# Patient Record
Sex: FEMALE | Race: OTHER | HISPANIC OR LATINO | Employment: OTHER | URBAN - METROPOLITAN AREA
[De-identification: names, ages, dates, MRNs, and addresses within clinical notes are randomized per-mention and may not be internally consistent; named-entity substitution may affect disease eponyms.]

---

## 2023-10-11 ENCOUNTER — HOSPITAL ENCOUNTER (OUTPATIENT)
Dept: RADIOLOGY | Facility: MEDICAL CENTER | Age: 73
End: 2023-10-11
Attending: NURSE PRACTITIONER
Payer: MEDICARE

## 2023-10-11 ENCOUNTER — OFFICE VISIT (OUTPATIENT)
Dept: URGENT CARE | Facility: PHYSICIAN GROUP | Age: 73
End: 2023-10-11
Payer: MEDICARE

## 2023-10-11 VITALS
HEART RATE: 76 BPM | SYSTOLIC BLOOD PRESSURE: 136 MMHG | DIASTOLIC BLOOD PRESSURE: 70 MMHG | TEMPERATURE: 96.9 F | RESPIRATION RATE: 17 BRPM | BODY MASS INDEX: 24.11 KG/M2 | HEIGHT: 62 IN | WEIGHT: 131 LBS | OXYGEN SATURATION: 98 %

## 2023-10-11 DIAGNOSIS — M79.672 LEFT FOOT PAIN: ICD-10-CM

## 2023-10-11 DIAGNOSIS — L03.116 CELLULITIS OF LEFT FOOT: ICD-10-CM

## 2023-10-11 PROCEDURE — 3078F DIAST BP <80 MM HG: CPT | Performed by: NURSE PRACTITIONER

## 2023-10-11 PROCEDURE — 73630 X-RAY EXAM OF FOOT: CPT | Mod: LT

## 2023-10-11 PROCEDURE — 99204 OFFICE O/P NEW MOD 45 MIN: CPT | Performed by: NURSE PRACTITIONER

## 2023-10-11 PROCEDURE — 3075F SYST BP GE 130 - 139MM HG: CPT | Performed by: NURSE PRACTITIONER

## 2023-10-11 RX ORDER — ATENOLOL 50 MG/1
50 TABLET ORAL DAILY
Status: ON HOLD | COMMUNITY
Start: 2023-10-02 | End: 2024-03-25 | Stop reason: CLARIF

## 2023-10-11 RX ORDER — BENAZEPRIL HYDROCHLORIDE 20 MG/1
20 TABLET ORAL DAILY
Status: ON HOLD | COMMUNITY
Start: 2023-10-02 | End: 2024-03-25 | Stop reason: CLARIF

## 2023-10-11 RX ORDER — EMPAGLIFLOZIN 10 MG/1
10 TABLET, FILM COATED ORAL DAILY
Status: ON HOLD | COMMUNITY
Start: 2023-10-02 | End: 2024-03-25 | Stop reason: CLARIF

## 2023-10-11 RX ORDER — CEPHALEXIN 500 MG/1
500 CAPSULE ORAL 4 TIMES DAILY
Qty: 20 CAPSULE | Refills: 0 | Status: SHIPPED | OUTPATIENT
Start: 2023-10-11 | End: 2023-10-16

## 2023-10-11 RX ORDER — SEMAGLUTIDE 1.34 MG/ML
1 INJECTION, SOLUTION SUBCUTANEOUS
Status: ON HOLD | COMMUNITY

## 2023-10-11 RX ORDER — LORAZEPAM 0.5 MG/1
0.5 TABLET ORAL PRN
Status: ON HOLD | COMMUNITY
Start: 2023-07-26

## 2023-10-11 RX ORDER — PRAVASTATIN SODIUM 40 MG
40 TABLET ORAL
Status: ON HOLD | COMMUNITY
Start: 2023-10-02 | End: 2024-03-25 | Stop reason: CLARIF

## 2023-10-11 RX ORDER — RANITIDINE 150 MG/1
TABLET ORAL
Status: ON HOLD | COMMUNITY
End: 2024-03-25 | Stop reason: CLARIF

## 2023-10-11 RX ORDER — INSULIN DETEMIR 100 [IU]/ML
30 INJECTION, SOLUTION SUBCUTANEOUS
Status: ON HOLD | COMMUNITY
Start: 2023-10-02

## 2023-10-11 RX ORDER — FAMOTIDINE 20 MG/1
20 TABLET, FILM COATED ORAL PRN
Status: ON HOLD | COMMUNITY

## 2023-10-11 RX ORDER — ALLOPURINOL 100 MG/1
TABLET ORAL
Status: ON HOLD | COMMUNITY
Start: 2023-06-30

## 2023-10-11 RX ORDER — GABAPENTIN 300 MG/1
300 CAPSULE ORAL
Status: ON HOLD | COMMUNITY
Start: 2023-10-02

## 2023-10-11 RX ORDER — BLOOD SUGAR DIAGNOSTIC
STRIP MISCELLANEOUS
Status: ON HOLD | COMMUNITY
Start: 2023-04-24

## 2023-10-11 ASSESSMENT — ENCOUNTER SYMPTOMS
CONSTITUTIONAL NEGATIVE: 1
RESPIRATORY NEGATIVE: 1
CHILLS: 0
NEUROLOGICAL NEGATIVE: 1
SHORTNESS OF BREATH: 0
FEVER: 0
CARDIOVASCULAR NEGATIVE: 1

## 2023-10-11 ASSESSMENT — VISUAL ACUITY: OU: 1

## 2023-10-11 NOTE — PROGRESS NOTES
Subjective:     Capri Quinonez is a 73 y.o. female who presents for Foot Swelling (X1 1/2 week L foot swelling, type 2 diabetic )       Foot Swelling  This is a new problem. The problem has been gradually worsening. Pertinent negatives include no chest pain, chills or fever.     2 weeks ago, patient started to experience continuous onset of swelling, pain, warmth, and redness at the top of her left foot.  Starting to experience symptoms of burning approximately to her ankle now.  Denies injury.    Reports history of diabetes as well as diabetic neuropathy.  Takes gabapentin.    Denies fever, chills, calf pain, chest pain, shortness of breath, or other symptoms.    Review of Systems   Constitutional: Negative.  Negative for chills and fever.   Respiratory: Negative.  Negative for shortness of breath.    Cardiovascular: Negative.  Negative for chest pain.   Musculoskeletal:         Per HPI   Neurological: Negative.    All other systems reviewed and are negative.    Refer to HPI for additional details.    During this visit, appropriate PPE was worn, and hand hygiene was performed.    PMH:  has no past medical history on file.    MEDS:   Current Outpatient Medications:     gabapentin (NEURONTIN) 300 MG Cap, Take 300 mg by mouth., Disp: , Rfl:     pravastatin (PRAVACHOL) 40 MG tablet, Take 40 mg by mouth., Disp: , Rfl:     insulin detemir (LEVEMIR FLEXPEN) 100 UNIT/ML injection PEN, Inject 30 Units under the skin., Disp: , Rfl:     glucose blood (ONETOUCH VERIO) strip, TEST BLOOD GLUCOSE TWICE DAILY AS DIRECTED, Disp: , Rfl:     allopurinol (ZYLOPRIM) 100 MG Tab, GEN FOR ZYLOPRIM. TAKE 1 TABLET(100 MG) BY MOUTH DAILY, Disp: , Rfl:     atenolol (TENORMIN) 50 MG Tab, Take 50 mg by mouth every day., Disp: , Rfl:     benazepril (LOTENSIN) 20 MG Tab, Take 20 mg by mouth every day., Disp: , Rfl:     Empagliflozin (JARDIANCE) 10 MG Tab tablet, Take 10 mg by mouth every day., Disp: , Rfl:     famotidine (PEPCID) 20 MG Tab,  "Take 20 mg by mouth., Disp: , Rfl:     raNITidine (ZANTAC) 150 MG Tab, Take  by mouth., Disp: , Rfl:     Semaglutide, 1 MG/DOSE, 4 MG/3ML Solution Pen-injector, Inject 1 mg under the skin., Disp: , Rfl:     LORazepam (ATIVAN) 0.5 MG Tab, Take 0.5 mg by mouth., Disp: , Rfl:     Semaglutide, 1 MG/DOSE, (OZEMPIC, 1 MG/DOSE,) 2 MG/1.5ML Solution Pen-injector, Inject  under the skin., Disp: , Rfl:     cephALEXin (KEFLEX) 500 MG Cap, Take 1 Capsule by mouth 4 times a day for 5 days., Disp: 20 Capsule, Rfl: 0    ALLERGIES:   Allergies   Allergen Reactions    Atorvastatin Unspecified    Hydrocodone-Acetaminophen Nausea and Unspecified     SURGHX: History reviewed. No pertinent surgical history.  SOCHX:  reports that she has been smoking cigarettes. She has never used smokeless tobacco. She reports that she does not currently use alcohol. She reports that she does not use drugs.    FH: Per HPI as applicable/pertinent.      Objective:     /70   Pulse 76   Temp 36.1 °C (96.9 °F) (Temporal)   Resp 17   Ht 1.575 m (5' 2\")   Wt 59.4 kg (131 lb)   SpO2 98%   BMI 23.96 kg/m²     Physical Exam  Nursing note reviewed.   Constitutional:       General: She is not in acute distress.     Appearance: She is well-developed. She is not ill-appearing or toxic-appearing.   Eyes:      General: Vision grossly intact.   Cardiovascular:      Rate and Rhythm: Normal rate.      Pulses: Normal pulses.   Pulmonary:      Effort: Pulmonary effort is normal. No respiratory distress.   Musculoskeletal:         General: No deformity. Normal range of motion.      Left lower leg: No swelling or tenderness.      Left foot: Normal range of motion and normal capillary refill. Swelling and tenderness present. No deformity or laceration. Normal pulse.      Comments: Erythema, warmth, TTP, swelling over dorsal left foot spreading to ankle; negative left Homans' sign, no left calf tenderness   Skin:     General: Skin is warm and dry.      Capillary " Refill: Capillary refill takes less than 2 seconds.      Coloration: Skin is not pale.      Findings: Erythema present. No lesion or rash.   Neurological:      Mental Status: She is alert and oriented to person, place, and time.      Motor: No weakness.   Psychiatric:         Behavior: Behavior normal. Behavior is cooperative.     X-ray of left foot:    Details    Reading Physician Reading Date Result Priority   Anson Hinojosa M.D.  [phone] 10/11/2023 Urgent [facility]     Narrative & Impression     10/11/2023 12:36 PM     HISTORY/REASON FOR EXAM:  Left foot pain after left foot injury    TECHNIQUE/EXAM DESCRIPTION AND NUMBER OF VIEWS:  3 nonweightbearing views of the LEFT foot.     COMPARISON:  No comparison available     FINDINGS:  Bone mineralization is normal.  There is no evidence of fracture or dislocation.  Soft tissues are normal.  Inferior calcaneal spur is identified.     IMPRESSION:     No evidence of fracture or dislocation.           Exam Ended: 10/11/23 12:45 PM Last Resulted: 10/11/23  1:00 PM           Radiology report and images reviewed by myself. Concur with findings.      Assessment/Plan:     1. Left foot pain  - DX-FOOT-COMPLETE 3+ LEFT; Future    2. Cellulitis of left foot  - cephALEXin (KEFLEX) 500 MG Cap; Take 1 Capsule by mouth 4 times a day for 5 days.  Dispense: 20 Capsule; Refill: 0    Symptoms most consistent with cellulitis.  Rx as above electronically.    Rest and elevate.  OTC ibuprofen as needed for inflammation/pain.    Discussed unlikely DVT.  However, advised to monitor.  Warning signs reviewed.  Patient defers ultrasound at this time.  Urine/ER precautions advised.    Differential diagnosis, natural history, supportive care, over-the-counter symptom management per 's instructions, close monitoring, and indications for immediate follow-up discussed.     All questions answered. Patient agrees with the plan of care.    Billing note: moderate complexity and moderate  risk. New patient. [id number]. Please refer to LOS tool for details.

## 2024-03-25 ENCOUNTER — APPOINTMENT (OUTPATIENT)
Dept: RADIOLOGY | Facility: MEDICAL CENTER | Age: 74
End: 2024-03-25
Attending: EMERGENCY MEDICINE
Payer: MEDICARE

## 2024-03-25 ENCOUNTER — HOSPITAL ENCOUNTER (OUTPATIENT)
Dept: RADIOLOGY | Facility: MEDICAL CENTER | Age: 74
End: 2024-03-25

## 2024-03-25 ENCOUNTER — HOSPITAL ENCOUNTER (INPATIENT)
Facility: MEDICAL CENTER | Age: 74
LOS: 3 days | End: 2024-03-28
Attending: SURGERY | Admitting: SURGERY
Payer: MEDICARE
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
## 2024-03-25 DIAGNOSIS — S36.039A SPLENIC LACERATION, INITIAL ENCOUNTER: ICD-10-CM

## 2024-03-25 DIAGNOSIS — S02.92XA CLOSED FRACTURE OF FACIAL BONE, UNSPECIFIED FACIAL BONE, INITIAL ENCOUNTER (HCC): ICD-10-CM

## 2024-03-25 DIAGNOSIS — T14.90XA TRAUMA: ICD-10-CM

## 2024-03-25 DIAGNOSIS — S20.212A CHEST WALL CONTUSION, LEFT, INITIAL ENCOUNTER: ICD-10-CM

## 2024-03-25 DIAGNOSIS — R55 SYNCOPE, UNSPECIFIED SYNCOPE TYPE: ICD-10-CM

## 2024-03-25 DIAGNOSIS — S02.92XA FACIAL FRACTURE DUE TO FALL, CLOSED, INITIAL ENCOUNTER (HCC): ICD-10-CM

## 2024-03-25 DIAGNOSIS — S16.1XXA STRAIN OF NECK MUSCLE, INITIAL ENCOUNTER: ICD-10-CM

## 2024-03-25 DIAGNOSIS — S36.039A LACERATION OF SPLEEN, INITIAL ENCOUNTER: ICD-10-CM

## 2024-03-25 DIAGNOSIS — T67.1XXA HEAT SYNCOPE, INITIAL ENCOUNTER: ICD-10-CM

## 2024-03-25 DIAGNOSIS — Z79.02 ANTIPLATELET OR ANTITHROMBOTIC LONG-TERM USE: ICD-10-CM

## 2024-03-25 PROBLEM — I10 HYPERTENSION: Status: ACTIVE | Noted: 2024-03-25

## 2024-03-25 PROBLEM — E78.5 DYSLIPIDEMIA: Status: ACTIVE | Noted: 2024-03-25

## 2024-03-25 PROBLEM — M10.9 GOUT: Status: ACTIVE | Noted: 2024-03-25

## 2024-03-25 PROBLEM — S02.85XA ORBITAL FRACTURE, CLOSED, INITIAL ENCOUNTER (HCC): Status: ACTIVE | Noted: 2024-03-25

## 2024-03-25 PROBLEM — K74.60 CIRRHOSIS (HCC): Status: ACTIVE | Noted: 2024-03-25

## 2024-03-25 PROBLEM — Z53.09 CONTRAINDICATION TO DEEP VEIN THROMBOSIS (DVT) PROPHYLAXIS: Status: ACTIVE | Noted: 2024-03-25

## 2024-03-25 LAB
ABO + RH BLD: NORMAL
ABO GROUP BLD: NORMAL
ALBUMIN SERPL BCP-MCNC: 4 G/DL (ref 3.2–4.9)
ALBUMIN/GLOB SERPL: 1 G/DL
ALP SERPL-CCNC: 504 U/L (ref 30–99)
ALT SERPL-CCNC: 137 U/L (ref 2–50)
ANION GAP SERPL CALC-SCNC: 12 MMOL/L (ref 7–16)
APTT PPP: 31.8 SEC (ref 24.7–36)
AST SERPL-CCNC: 301 U/L (ref 12–45)
BILIRUB SERPL-MCNC: 2.1 MG/DL (ref 0.1–1.5)
BLD GP AB SCN SERPL QL: NORMAL
BUN SERPL-MCNC: 16 MG/DL (ref 8–22)
CALCIUM ALBUM COR SERPL-MCNC: 9.7 MG/DL (ref 8.5–10.5)
CALCIUM SERPL-MCNC: 9.7 MG/DL (ref 8.5–10.5)
CFT BLD TEG: 5.3 MIN (ref 4.6–9.1)
CFT P HPASE BLD TEG: 5.1 MIN (ref 4.3–8.3)
CHLORIDE SERPL-SCNC: 99 MMOL/L (ref 96–112)
CLOT ANGLE BLD TEG: 74.4 DEGREES (ref 63–78)
CLOT LYSIS 30M P MA LENFR BLD TEG: 0.1 % (ref 0–2.6)
CO2 SERPL-SCNC: 27 MMOL/L (ref 20–33)
CREAT SERPL-MCNC: 0.79 MG/DL (ref 0.5–1.4)
CT.EXTRINSIC BLD ROTEM: 1.1 MIN (ref 0.8–2.1)
EKG IMPRESSION: NORMAL
EKG IMPRESSION: NORMAL
ERYTHROCYTE [DISTWIDTH] IN BLOOD BY AUTOMATED COUNT: 49.6 FL (ref 35.9–50)
EST. AVERAGE GLUCOSE BLD GHB EST-MCNC: 128 MG/DL
ETHANOL BLD-MCNC: <10.1 MG/DL
GFR SERPLBLD CREATININE-BSD FMLA CKD-EPI: 80 ML/MIN/1.73 M 2
GLOBULIN SER CALC-MCNC: 3.9 G/DL (ref 1.9–3.5)
GLUCOSE BLD STRIP.AUTO-MCNC: 137 MG/DL (ref 65–99)
GLUCOSE SERPL-MCNC: 146 MG/DL (ref 65–99)
HBA1C MFR BLD: 6.1 % (ref 4–5.6)
HCG SERPL QL: NEGATIVE
HCT VFR BLD AUTO: 41.1 % (ref 37–47)
HGB BLD-MCNC: 11.7 G/DL (ref 12–16)
HGB BLD-MCNC: 13.7 G/DL (ref 12–16)
INR PPP: 1.04 (ref 0.87–1.13)
MAGNESIUM SERPL-MCNC: 1.9 MG/DL (ref 1.5–2.5)
MCF BLD TEG: 64.6 MM (ref 52–69)
MCF.PLATELET INHIB BLD ROTEM: 30.6 MM (ref 15–32)
MCH RBC QN AUTO: 29.1 PG (ref 27–33)
MCHC RBC AUTO-ENTMCNC: 33.3 G/DL (ref 32.2–35.5)
MCV RBC AUTO: 87.4 FL (ref 81.4–97.8)
PA AA BLD-ACNC: 40.9 % (ref 0–11)
PA ADP BLD-ACNC: 19 % (ref 0–17)
PHOSPHATE SERPL-MCNC: 2.7 MG/DL (ref 2.5–4.5)
PLATELET # BLD AUTO: 191 K/UL (ref 164–446)
PMV BLD AUTO: 12.3 FL (ref 9–12.9)
POTASSIUM SERPL-SCNC: 3.1 MMOL/L (ref 3.6–5.5)
PROT SERPL-MCNC: 7.9 G/DL (ref 6–8.2)
PROTHROMBIN TIME: 13.7 SEC (ref 12–14.6)
RBC # BLD AUTO: 4.7 M/UL (ref 4.2–5.4)
RH BLD: NORMAL
SODIUM SERPL-SCNC: 138 MMOL/L (ref 135–145)
TEG ALGORITHM TGALG: ABNORMAL
TROPONIN T SERPL-MCNC: 40 NG/L (ref 6–19)
WBC # BLD AUTO: 10.4 K/UL (ref 4.8–10.8)

## 2024-03-25 PROCEDURE — 90715 TDAP VACCINE 7 YRS/> IM: CPT | Performed by: EMERGENCY MEDICINE

## 2024-03-25 PROCEDURE — 83735 ASSAY OF MAGNESIUM: CPT

## 2024-03-25 PROCEDURE — 84703 CHORIONIC GONADOTROPIN ASSAY: CPT

## 2024-03-25 PROCEDURE — 85384 FIBRINOGEN ACTIVITY: CPT | Mod: 91

## 2024-03-25 PROCEDURE — 85018 HEMOGLOBIN: CPT

## 2024-03-25 PROCEDURE — 93005 ELECTROCARDIOGRAM TRACING: CPT | Performed by: EMERGENCY MEDICINE

## 2024-03-25 PROCEDURE — 99222 1ST HOSP IP/OBS MODERATE 55: CPT | Performed by: SURGERY

## 2024-03-25 PROCEDURE — 83036 HEMOGLOBIN GLYCOSYLATED A1C: CPT

## 2024-03-25 PROCEDURE — 82962 GLUCOSE BLOOD TEST: CPT

## 2024-03-25 PROCEDURE — 86901 BLOOD TYPING SEROLOGIC RH(D): CPT

## 2024-03-25 PROCEDURE — 700102 HCHG RX REV CODE 250 W/ 637 OVERRIDE(OP): Performed by: NURSE PRACTITIONER

## 2024-03-25 PROCEDURE — 85730 THROMBOPLASTIN TIME PARTIAL: CPT

## 2024-03-25 PROCEDURE — 770022 HCHG ROOM/CARE - ICU (200)

## 2024-03-25 PROCEDURE — 93005 ELECTROCARDIOGRAM TRACING: CPT | Performed by: SURGERY

## 2024-03-25 PROCEDURE — 85027 COMPLETE CBC AUTOMATED: CPT

## 2024-03-25 PROCEDURE — 84100 ASSAY OF PHOSPHORUS: CPT

## 2024-03-25 PROCEDURE — 82077 ASSAY SPEC XCP UR&BREATH IA: CPT

## 2024-03-25 PROCEDURE — 72125 CT NECK SPINE W/O DYE: CPT

## 2024-03-25 PROCEDURE — 700111 HCHG RX REV CODE 636 W/ 250 OVERRIDE (IP): Mod: JZ | Performed by: NURSE PRACTITIONER

## 2024-03-25 PROCEDURE — 85610 PROTHROMBIN TIME: CPT

## 2024-03-25 PROCEDURE — 93010 ELECTROCARDIOGRAM REPORT: CPT | Mod: MISDOCU | Performed by: SURGERY

## 2024-03-25 PROCEDURE — 85347 COAGULATION TIME ACTIVATED: CPT

## 2024-03-25 PROCEDURE — 700111 HCHG RX REV CODE 636 W/ 250 OVERRIDE (IP): Performed by: EMERGENCY MEDICINE

## 2024-03-25 PROCEDURE — G0390 TRAUMA RESPONS W/HOSP CRITI: HCPCS

## 2024-03-25 PROCEDURE — 36415 COLL VENOUS BLD VENIPUNCTURE: CPT

## 2024-03-25 PROCEDURE — 70486 CT MAXILLOFACIAL W/O DYE: CPT

## 2024-03-25 PROCEDURE — 71045 X-RAY EXAM CHEST 1 VIEW: CPT

## 2024-03-25 PROCEDURE — A9270 NON-COVERED ITEM OR SERVICE: HCPCS | Performed by: NURSE PRACTITIONER

## 2024-03-25 PROCEDURE — 700105 HCHG RX REV CODE 258: Performed by: NURSE PRACTITIONER

## 2024-03-25 PROCEDURE — 85576 BLOOD PLATELET AGGREGATION: CPT | Mod: 91

## 2024-03-25 PROCEDURE — 93010 ELECTROCARDIOGRAM REPORT: CPT | Performed by: STUDENT IN AN ORGANIZED HEALTH CARE EDUCATION/TRAINING PROGRAM

## 2024-03-25 PROCEDURE — 84484 ASSAY OF TROPONIN QUANT: CPT

## 2024-03-25 PROCEDURE — 86850 RBC ANTIBODY SCREEN: CPT

## 2024-03-25 PROCEDURE — 99291 CRITICAL CARE FIRST HOUR: CPT

## 2024-03-25 PROCEDURE — 86900 BLOOD TYPING SEROLOGIC ABO: CPT

## 2024-03-25 PROCEDURE — 90471 IMMUNIZATION ADMIN: CPT

## 2024-03-25 PROCEDURE — 80053 COMPREHEN METABOLIC PANEL: CPT

## 2024-03-25 RX ORDER — ENEMA 19; 7 G/133ML; G/133ML
1 ENEMA RECTAL
Status: DISCONTINUED | OUTPATIENT
Start: 2024-03-25 | End: 2024-03-28 | Stop reason: HOSPADM

## 2024-03-25 RX ORDER — AMOXICILLIN 250 MG
1 CAPSULE ORAL
Status: DISCONTINUED | OUTPATIENT
Start: 2024-03-25 | End: 2024-03-28 | Stop reason: HOSPADM

## 2024-03-25 RX ORDER — HYDROMORPHONE HYDROCHLORIDE 1 MG/ML
0.25 INJECTION, SOLUTION INTRAMUSCULAR; INTRAVENOUS; SUBCUTANEOUS
Status: DISCONTINUED | OUTPATIENT
Start: 2024-03-25 | End: 2024-03-28 | Stop reason: HOSPADM

## 2024-03-25 RX ORDER — ALLOPURINOL 100 MG/1
100 TABLET ORAL DAILY
Status: DISCONTINUED | OUTPATIENT
Start: 2024-03-26 | End: 2024-03-28 | Stop reason: HOSPADM

## 2024-03-25 RX ORDER — LABETALOL HYDROCHLORIDE 5 MG/ML
10 INJECTION, SOLUTION INTRAVENOUS EVERY 6 HOURS PRN
Status: DISCONTINUED | OUTPATIENT
Start: 2024-03-25 | End: 2024-03-28 | Stop reason: HOSPADM

## 2024-03-25 RX ORDER — ACETAMINOPHEN 325 MG/1
650 TABLET ORAL EVERY 6 HOURS PRN
Status: DISCONTINUED | OUTPATIENT
Start: 2024-03-30 | End: 2024-03-28 | Stop reason: HOSPADM

## 2024-03-25 RX ORDER — AMOXICILLIN 250 MG
1 CAPSULE ORAL NIGHTLY
Status: DISCONTINUED | OUTPATIENT
Start: 2024-03-25 | End: 2024-03-28 | Stop reason: HOSPADM

## 2024-03-25 RX ORDER — POTASSIUM CHLORIDE 20 MEQ/1
20 TABLET, EXTENDED RELEASE ORAL DAILY
Status: DISCONTINUED | OUTPATIENT
Start: 2024-03-25 | End: 2024-03-28 | Stop reason: HOSPADM

## 2024-03-25 RX ORDER — SODIUM CHLORIDE, SODIUM LACTATE, POTASSIUM CHLORIDE, CALCIUM CHLORIDE 600; 310; 30; 20 MG/100ML; MG/100ML; MG/100ML; MG/100ML
INJECTION, SOLUTION INTRAVENOUS CONTINUOUS
Status: DISCONTINUED | OUTPATIENT
Start: 2024-03-25 | End: 2024-03-26

## 2024-03-25 RX ORDER — ATORVASTATIN CALCIUM 80 MG/1
80 TABLET, FILM COATED ORAL DAILY
COMMUNITY
Start: 2024-03-07

## 2024-03-25 RX ORDER — POLYETHYLENE GLYCOL 3350 17 G/17G
1 POWDER, FOR SOLUTION ORAL 2 TIMES DAILY
Status: DISCONTINUED | OUTPATIENT
Start: 2024-03-25 | End: 2024-03-28 | Stop reason: HOSPADM

## 2024-03-25 RX ORDER — OXYCODONE HYDROCHLORIDE 5 MG/1
5 TABLET ORAL
Status: DISCONTINUED | OUTPATIENT
Start: 2024-03-25 | End: 2024-03-28 | Stop reason: HOSPADM

## 2024-03-25 RX ORDER — ASPIRIN 81 MG/1
81 TABLET ORAL DAILY
COMMUNITY
Start: 2024-02-29 | End: 2024-12-25

## 2024-03-25 RX ORDER — ATORVASTATIN CALCIUM 80 MG/1
80 TABLET, FILM COATED ORAL DAILY
Status: DISCONTINUED | OUTPATIENT
Start: 2024-03-26 | End: 2024-03-28 | Stop reason: HOSPADM

## 2024-03-25 RX ORDER — ACETAMINOPHEN 325 MG/1
650 TABLET ORAL EVERY 6 HOURS
Status: DISCONTINUED | OUTPATIENT
Start: 2024-03-25 | End: 2024-03-28 | Stop reason: HOSPADM

## 2024-03-25 RX ORDER — CARVEDILOL 12.5 MG/1
12.5 TABLET ORAL 2 TIMES DAILY
Status: DISCONTINUED | OUTPATIENT
Start: 2024-03-25 | End: 2024-03-28 | Stop reason: HOSPADM

## 2024-03-25 RX ORDER — BISACODYL 10 MG
10 SUPPOSITORY, RECTAL RECTAL
Status: DISCONTINUED | OUTPATIENT
Start: 2024-03-25 | End: 2024-03-28 | Stop reason: HOSPADM

## 2024-03-25 RX ORDER — ONDANSETRON 4 MG/1
4 TABLET, ORALLY DISINTEGRATING ORAL EVERY 4 HOURS PRN
Status: DISCONTINUED | OUTPATIENT
Start: 2024-03-25 | End: 2024-03-28 | Stop reason: HOSPADM

## 2024-03-25 RX ORDER — CARVEDILOL 12.5 MG/1
12.5 TABLET ORAL 2 TIMES DAILY
COMMUNITY
Start: 2024-03-07

## 2024-03-25 RX ORDER — CLOPIDOGREL BISULFATE 75 MG/1
75 TABLET ORAL DAILY
COMMUNITY
Start: 2024-03-07 | End: 2025-03-02

## 2024-03-25 RX ORDER — OXYCODONE HYDROCHLORIDE 5 MG/1
2.5 TABLET ORAL
Status: DISCONTINUED | OUTPATIENT
Start: 2024-03-25 | End: 2024-03-28 | Stop reason: HOSPADM

## 2024-03-25 RX ORDER — COLCHICINE 0.6 MG/1
0.6 TABLET ORAL PRN
COMMUNITY
Start: 2024-02-02

## 2024-03-25 RX ORDER — ONDANSETRON 2 MG/ML
4 INJECTION INTRAMUSCULAR; INTRAVENOUS EVERY 4 HOURS PRN
Status: DISCONTINUED | OUTPATIENT
Start: 2024-03-25 | End: 2024-03-28 | Stop reason: HOSPADM

## 2024-03-25 RX ORDER — DOCUSATE SODIUM 100 MG/1
100 CAPSULE, LIQUID FILLED ORAL 2 TIMES DAILY
Status: DISCONTINUED | OUTPATIENT
Start: 2024-03-25 | End: 2024-03-28 | Stop reason: HOSPADM

## 2024-03-25 RX ADMIN — DOCUSATE SODIUM 50 MG AND SENNOSIDES 8.6 MG 1 TABLET: 8.6; 5 TABLET, FILM COATED ORAL at 20:52

## 2024-03-25 RX ADMIN — CLOSTRIDIUM TETANI TOXOID ANTIGEN (FORMALDEHYDE INACTIVATED), CORYNEBACTERIUM DIPHTHERIAE TOXOID ANTIGEN (FORMALDEHYDE INACTIVATED), BORDETELLA PERTUSSIS TOXOID ANTIGEN (GLUTARALDEHYDE INACTIVATED), BORDETELLA PERTUSSIS FILAMENTOUS HEMAGGLUTININ ANTIGEN (FORMALDEHYDE INACTIVATED), BORDETELLA PERTUSSIS PERTACTIN ANTIGEN, AND BORDETELLA PERTUSSIS FIMBRIAE 2/3 ANTIGEN 0.5 ML: 5; 2; 2.5; 5; 3; 5 INJECTION, SUSPENSION INTRAMUSCULAR at 20:10

## 2024-03-25 RX ADMIN — SODIUM CHLORIDE, POTASSIUM CHLORIDE, SODIUM LACTATE AND CALCIUM CHLORIDE: 600; 310; 30; 20 INJECTION, SOLUTION INTRAVENOUS at 20:52

## 2024-03-25 RX ADMIN — ACETAMINOPHEN 650 MG: 325 TABLET, FILM COATED ORAL at 20:45

## 2024-03-25 RX ADMIN — ONDANSETRON 4 MG: 2 INJECTION INTRAMUSCULAR; INTRAVENOUS at 21:35

## 2024-03-25 ASSESSMENT — PATIENT HEALTH QUESTIONNAIRE - PHQ9
2. FEELING DOWN, DEPRESSED, IRRITABLE, OR HOPELESS: NOT AT ALL
SUM OF ALL RESPONSES TO PHQ9 QUESTIONS 1 AND 2: 0
1. LITTLE INTEREST OR PLEASURE IN DOING THINGS: NOT AT ALL

## 2024-03-25 ASSESSMENT — COGNITIVE AND FUNCTIONAL STATUS - GENERAL
MOVING FROM LYING ON BACK TO SITTING ON SIDE OF FLAT BED: A LITTLE
TOILETING: A LITTLE
MOBILITY SCORE: 21
SUGGESTED CMS G CODE MODIFIER MOBILITY: CJ
CLIMB 3 TO 5 STEPS WITH RAILING: A LITTLE
SUGGESTED CMS G CODE MODIFIER DAILY ACTIVITY: CJ
DAILY ACTIVITIY SCORE: 22
MOVING TO AND FROM BED TO CHAIR: A LITTLE
DRESSING REGULAR LOWER BODY CLOTHING: A LITTLE

## 2024-03-25 ASSESSMENT — LIFESTYLE VARIABLES
TOTAL SCORE: 0
HOW MANY TIMES IN THE PAST YEAR HAVE YOU HAD 5 OR MORE DRINKS IN A DAY: 0
HAVE YOU EVER FELT YOU SHOULD CUT DOWN ON YOUR DRINKING: NO
ON A TYPICAL DAY WHEN YOU DRINK ALCOHOL HOW MANY DRINKS DO YOU HAVE: 0
EVER FELT BAD OR GUILTY ABOUT YOUR DRINKING: NO
AVERAGE NUMBER OF DAYS PER WEEK YOU HAVE A DRINK CONTAINING ALCOHOL: 0
TOTAL SCORE: 0
CONSUMPTION TOTAL: NEGATIVE
EVER HAD A DRINK FIRST THING IN THE MORNING TO STEADY YOUR NERVES TO GET RID OF A HANGOVER: NO
TOTAL SCORE: 0
DOES PATIENT WANT TO STOP DRINKING: NO
ALCOHOL_USE: NO
HAVE PEOPLE ANNOYED YOU BY CRITICIZING YOUR DRINKING: NO

## 2024-03-25 ASSESSMENT — PAIN DESCRIPTION - PAIN TYPE
TYPE: ACUTE PAIN
TYPE: ACUTE PAIN

## 2024-03-25 ASSESSMENT — COPD QUESTIONNAIRES
DURING THE PAST 4 WEEKS HOW MUCH DID YOU FEEL SHORT OF BREATH: NONE/LITTLE OF THE TIME
COPD SCREENING SCORE: 4
DO YOU EVER COUGH UP ANY MUCUS OR PHLEGM?: NO/ONLY WITH OCCASIONAL COLDS OR INFECTIONS
HAVE YOU SMOKED AT LEAST 100 CIGARETTES IN YOUR ENTIRE LIFE: YES

## 2024-03-26 ENCOUNTER — APPOINTMENT (OUTPATIENT)
Dept: CARDIOLOGY | Facility: MEDICAL CENTER | Age: 74
End: 2024-03-26
Attending: NURSE PRACTITIONER
Payer: MEDICARE

## 2024-03-26 ENCOUNTER — APPOINTMENT (OUTPATIENT)
Dept: RADIOLOGY | Facility: MEDICAL CENTER | Age: 74
End: 2024-03-26
Attending: NURSE PRACTITIONER
Payer: MEDICARE

## 2024-03-26 PROBLEM — E11.9 TYPE 2 DIABETES MELLITUS, WITH LONG-TERM CURRENT USE OF INSULIN (HCC): Status: ACTIVE | Noted: 2024-03-26

## 2024-03-26 PROBLEM — Z79.4 TYPE 2 DIABETES MELLITUS, WITH LONG-TERM CURRENT USE OF INSULIN (HCC): Status: ACTIVE | Noted: 2024-03-26

## 2024-03-26 PROBLEM — E87.6 HYPOKALEMIA: Status: ACTIVE | Noted: 2024-03-26

## 2024-03-26 PROBLEM — E11.40 DIABETIC NEUROPATHY ASSOCIATED WITH TYPE 2 DIABETES MELLITUS (HCC): Status: ACTIVE | Noted: 2024-03-26

## 2024-03-26 LAB
ALBUMIN SERPL BCP-MCNC: 3.7 G/DL (ref 3.2–4.9)
ALBUMIN/GLOB SERPL: 1.2 G/DL
ALP SERPL-CCNC: 431 U/L (ref 30–99)
ALT SERPL-CCNC: 134 U/L (ref 2–50)
AMMONIA PLAS-SCNC: 22 UMOL/L (ref 11–45)
ANION GAP SERPL CALC-SCNC: 12 MMOL/L (ref 7–16)
AST SERPL-CCNC: 238 U/L (ref 12–45)
BASOPHILS # BLD AUTO: 0.5 % (ref 0–1.8)
BASOPHILS # BLD: 0.04 K/UL (ref 0–0.12)
BILIRUB SERPL-MCNC: 1.6 MG/DL (ref 0.1–1.5)
BUN SERPL-MCNC: 17 MG/DL (ref 8–22)
CALCIUM ALBUM COR SERPL-MCNC: 9.4 MG/DL (ref 8.5–10.5)
CALCIUM SERPL-MCNC: 9.2 MG/DL (ref 8.5–10.5)
CHLORIDE SERPL-SCNC: 100 MMOL/L (ref 96–112)
CO2 SERPL-SCNC: 27 MMOL/L (ref 20–33)
CREAT SERPL-MCNC: 0.71 MG/DL (ref 0.5–1.4)
EOSINOPHIL # BLD AUTO: 0.11 K/UL (ref 0–0.51)
EOSINOPHIL NFR BLD: 1.3 % (ref 0–6.9)
ERYTHROCYTE [DISTWIDTH] IN BLOOD BY AUTOMATED COUNT: 49 FL (ref 35.9–50)
GFR SERPLBLD CREATININE-BSD FMLA CKD-EPI: 90 ML/MIN/1.73 M 2
GLOBULIN SER CALC-MCNC: 3.2 G/DL (ref 1.9–3.5)
GLUCOSE BLD STRIP.AUTO-MCNC: 119 MG/DL (ref 65–99)
GLUCOSE BLD STRIP.AUTO-MCNC: 121 MG/DL (ref 65–99)
GLUCOSE BLD STRIP.AUTO-MCNC: 129 MG/DL (ref 65–99)
GLUCOSE BLD STRIP.AUTO-MCNC: 159 MG/DL (ref 65–99)
GLUCOSE BLD STRIP.AUTO-MCNC: 178 MG/DL (ref 65–99)
GLUCOSE SERPL-MCNC: 183 MG/DL (ref 65–99)
HCT VFR BLD AUTO: 34.2 % (ref 37–47)
HGB BLD-MCNC: 11 G/DL (ref 12–16)
HGB BLD-MCNC: 11.6 G/DL (ref 12–16)
HGB BLD-MCNC: 11.9 G/DL (ref 12–16)
IMM GRANULOCYTES # BLD AUTO: 0.03 K/UL (ref 0–0.11)
IMM GRANULOCYTES NFR BLD AUTO: 0.4 % (ref 0–0.9)
LV EJECT FRACT  99904: 68
LV EJECT FRACT MOD 2C 99903: 59.08
LV EJECT FRACT MOD 4C 99902: 74.97
LV EJECT FRACT MOD BP 99901: 68.12
LYMPHOCYTES # BLD AUTO: 0.93 K/UL (ref 1–4.8)
LYMPHOCYTES NFR BLD: 11 % (ref 22–41)
MAGNESIUM SERPL-MCNC: 1.9 MG/DL (ref 1.5–2.5)
MCH RBC QN AUTO: 29.9 PG (ref 27–33)
MCHC RBC AUTO-ENTMCNC: 34.8 G/DL (ref 32.2–35.5)
MCV RBC AUTO: 85.9 FL (ref 81.4–97.8)
MONOCYTES # BLD AUTO: 0.47 K/UL (ref 0–0.85)
MONOCYTES NFR BLD AUTO: 5.6 % (ref 0–13.4)
NEUTROPHILS # BLD AUTO: 6.85 K/UL (ref 1.82–7.42)
NEUTROPHILS NFR BLD: 81.2 % (ref 44–72)
NRBC # BLD AUTO: 0 K/UL
NRBC BLD-RTO: 0 /100 WBC (ref 0–0.2)
PHOSPHATE SERPL-MCNC: 3.1 MG/DL (ref 2.5–4.5)
PLATELET # BLD AUTO: 169 K/UL (ref 164–446)
PMV BLD AUTO: 12.4 FL (ref 9–12.9)
POTASSIUM SERPL-SCNC: 3.1 MMOL/L (ref 3.6–5.5)
PROT SERPL-MCNC: 6.9 G/DL (ref 6–8.2)
RBC # BLD AUTO: 3.98 M/UL (ref 4.2–5.4)
SODIUM SERPL-SCNC: 139 MMOL/L (ref 135–145)
TROPONIN T SERPL-MCNC: 34 NG/L (ref 6–19)
WBC # BLD AUTO: 8.4 K/UL (ref 4.8–10.8)

## 2024-03-26 PROCEDURE — 97535 SELF CARE MNGMENT TRAINING: CPT

## 2024-03-26 PROCEDURE — 85018 HEMOGLOBIN: CPT

## 2024-03-26 PROCEDURE — 83735 ASSAY OF MAGNESIUM: CPT

## 2024-03-26 PROCEDURE — A9270 NON-COVERED ITEM OR SERVICE: HCPCS | Performed by: NURSE PRACTITIONER

## 2024-03-26 PROCEDURE — 97162 PT EVAL MOD COMPLEX 30 MIN: CPT

## 2024-03-26 PROCEDURE — 93306 TTE W/DOPPLER COMPLETE: CPT | Mod: 26 | Performed by: INTERNAL MEDICINE

## 2024-03-26 PROCEDURE — 700111 HCHG RX REV CODE 636 W/ 250 OVERRIDE (IP): Mod: JZ

## 2024-03-26 PROCEDURE — 97165 OT EVAL LOW COMPLEX 30 MIN: CPT

## 2024-03-26 PROCEDURE — 84100 ASSAY OF PHOSPHORUS: CPT

## 2024-03-26 PROCEDURE — 82962 GLUCOSE BLOOD TEST: CPT | Mod: 91

## 2024-03-26 PROCEDURE — 36415 COLL VENOUS BLD VENIPUNCTURE: CPT

## 2024-03-26 PROCEDURE — 85025 COMPLETE CBC W/AUTO DIFF WBC: CPT

## 2024-03-26 PROCEDURE — 80053 COMPREHEN METABOLIC PANEL: CPT

## 2024-03-26 PROCEDURE — 700102 HCHG RX REV CODE 250 W/ 637 OVERRIDE(OP)

## 2024-03-26 PROCEDURE — 700102 HCHG RX REV CODE 250 W/ 637 OVERRIDE(OP): Performed by: NURSE PRACTITIONER

## 2024-03-26 PROCEDURE — A9270 NON-COVERED ITEM OR SERVICE: HCPCS

## 2024-03-26 PROCEDURE — 770020 HCHG ROOM/CARE - TELE (206)

## 2024-03-26 PROCEDURE — 84484 ASSAY OF TROPONIN QUANT: CPT

## 2024-03-26 PROCEDURE — 700111 HCHG RX REV CODE 636 W/ 250 OVERRIDE (IP): Performed by: NURSE PRACTITIONER

## 2024-03-26 PROCEDURE — 82140 ASSAY OF AMMONIA: CPT

## 2024-03-26 PROCEDURE — 93306 TTE W/DOPPLER COMPLETE: CPT

## 2024-03-26 PROCEDURE — 93970 EXTREMITY STUDY: CPT

## 2024-03-26 PROCEDURE — 99232 SBSQ HOSP IP/OBS MODERATE 35: CPT

## 2024-03-26 RX ORDER — ACETAMINOPHEN 325 MG/1
650 TABLET ORAL EVERY 4 HOURS PRN
Qty: 30 TABLET | Refills: 0 | Status: SHIPPED | OUTPATIENT
Start: 2024-03-26

## 2024-03-26 RX ORDER — CALCIUM CARBONATE 500 MG/1
500 TABLET, CHEWABLE ORAL 4 TIMES DAILY PRN
Status: DISCONTINUED | OUTPATIENT
Start: 2024-03-26 | End: 2024-03-28 | Stop reason: HOSPADM

## 2024-03-26 RX ORDER — MAGNESIUM SULFATE HEPTAHYDRATE 40 MG/ML
2 INJECTION, SOLUTION INTRAVENOUS ONCE
Status: COMPLETED | OUTPATIENT
Start: 2024-03-26 | End: 2024-03-26

## 2024-03-26 RX ORDER — GABAPENTIN 300 MG/1
300 CAPSULE ORAL 2 TIMES DAILY
Status: DISCONTINUED | OUTPATIENT
Start: 2024-03-26 | End: 2024-03-28 | Stop reason: HOSPADM

## 2024-03-26 RX ORDER — CLOPIDOGREL BISULFATE 75 MG/1
75 TABLET ORAL DAILY
Status: DISCONTINUED | OUTPATIENT
Start: 2024-03-26 | End: 2024-03-28 | Stop reason: HOSPADM

## 2024-03-26 RX ORDER — COLCHICINE 0.6 MG/1
0.6 TABLET ORAL
Status: DISCONTINUED | OUTPATIENT
Start: 2024-03-26 | End: 2024-03-28 | Stop reason: HOSPADM

## 2024-03-26 RX ORDER — POTASSIUM CHLORIDE 20 MEQ/1
40 TABLET, EXTENDED RELEASE ORAL ONCE
Status: COMPLETED | OUTPATIENT
Start: 2024-03-26 | End: 2024-03-26

## 2024-03-26 RX ORDER — ASPIRIN 81 MG/1
81 TABLET ORAL DAILY
Status: DISCONTINUED | OUTPATIENT
Start: 2024-03-26 | End: 2024-03-28 | Stop reason: HOSPADM

## 2024-03-26 RX ADMIN — ACETAMINOPHEN 650 MG: 325 TABLET, FILM COATED ORAL at 23:27

## 2024-03-26 RX ADMIN — CARVEDILOL 12.5 MG: 12.5 TABLET, FILM COATED ORAL at 05:27

## 2024-03-26 RX ADMIN — GABAPENTIN 300 MG: 300 CAPSULE ORAL at 17:28

## 2024-03-26 RX ADMIN — ACETAMINOPHEN 650 MG: 325 TABLET, FILM COATED ORAL at 00:18

## 2024-03-26 RX ADMIN — DOCUSATE SODIUM 100 MG: 100 CAPSULE, LIQUID FILLED ORAL at 17:28

## 2024-03-26 RX ADMIN — ACETAMINOPHEN 650 MG: 325 TABLET, FILM COATED ORAL at 05:26

## 2024-03-26 RX ADMIN — ACETAMINOPHEN 650 MG: 325 TABLET, FILM COATED ORAL at 17:27

## 2024-03-26 RX ADMIN — ANTACID TABLETS 500 MG: 500 TABLET, CHEWABLE ORAL at 20:27

## 2024-03-26 RX ADMIN — ANTACID TABLETS 500 MG: 500 TABLET, CHEWABLE ORAL at 14:35

## 2024-03-26 RX ADMIN — CLOPIDOGREL BISULFATE 75 MG: 75 TABLET ORAL at 09:25

## 2024-03-26 RX ADMIN — CARVEDILOL 12.5 MG: 12.5 TABLET, FILM COATED ORAL at 00:18

## 2024-03-26 RX ADMIN — ALLOPURINOL 100 MG: 100 TABLET ORAL at 05:27

## 2024-03-26 RX ADMIN — DOCUSATE SODIUM 50 MG AND SENNOSIDES 8.6 MG 1 TABLET: 8.6; 5 TABLET, FILM COATED ORAL at 20:22

## 2024-03-26 RX ADMIN — ACETAMINOPHEN 650 MG: 325 TABLET, FILM COATED ORAL at 12:14

## 2024-03-26 RX ADMIN — GABAPENTIN 300 MG: 300 CAPSULE ORAL at 09:26

## 2024-03-26 RX ADMIN — INSULIN HUMAN 1 UNITS: 100 INJECTION, SOLUTION PARENTERAL at 00:21

## 2024-03-26 RX ADMIN — POTASSIUM CHLORIDE 20 MEQ: 1500 TABLET, EXTENDED RELEASE ORAL at 00:18

## 2024-03-26 RX ADMIN — ASPIRIN 81 MG: 81 TABLET, COATED ORAL at 09:25

## 2024-03-26 RX ADMIN — POTASSIUM CHLORIDE 40 MEQ: 1500 TABLET, EXTENDED RELEASE ORAL at 09:25

## 2024-03-26 RX ADMIN — MAGNESIUM SULFATE HEPTAHYDRATE 2 G: 2 INJECTION, SOLUTION INTRAVENOUS at 09:26

## 2024-03-26 RX ADMIN — ATORVASTATIN CALCIUM 80 MG: 80 TABLET, FILM COATED ORAL at 05:26

## 2024-03-26 RX ADMIN — OXYCODONE 5 MG: 5 TABLET ORAL at 23:27

## 2024-03-26 RX ADMIN — HYDROMORPHONE HYDROCHLORIDE 0.25 MG: 1 INJECTION, SOLUTION INTRAMUSCULAR; INTRAVENOUS; SUBCUTANEOUS at 10:44

## 2024-03-26 RX ADMIN — INSULIN HUMAN 1 UNITS: 100 INJECTION, SOLUTION PARENTERAL at 17:30

## 2024-03-26 RX ADMIN — DOCUSATE SODIUM 100 MG: 100 CAPSULE, LIQUID FILLED ORAL at 05:26

## 2024-03-26 RX ADMIN — CARVEDILOL 12.5 MG: 12.5 TABLET, FILM COATED ORAL at 17:28

## 2024-03-26 RX ADMIN — POLYETHYLENE GLYCOL 3350 1 PACKET: 17 POWDER, FOR SOLUTION ORAL at 17:28

## 2024-03-26 ASSESSMENT — COGNITIVE AND FUNCTIONAL STATUS - GENERAL
DAILY ACTIVITIY SCORE: 21
CLIMB 3 TO 5 STEPS WITH RAILING: A LITTLE
DAILY ACTIVITIY SCORE: 20
MOVING TO AND FROM BED TO CHAIR: A LITTLE
WALKING IN HOSPITAL ROOM: A LITTLE
MOBILITY SCORE: 19
DRESSING REGULAR LOWER BODY CLOTHING: A LITTLE
MOVING TO AND FROM BED TO CHAIR: A LITTLE
SUGGESTED CMS G CODE MODIFIER MOBILITY: CJ
SUGGESTED CMS G CODE MODIFIER MOBILITY: CK
MOVING FROM LYING ON BACK TO SITTING ON SIDE OF FLAT BED: A LITTLE
DRESSING REGULAR LOWER BODY CLOTHING: A LITTLE
STANDING UP FROM CHAIR USING ARMS: A LITTLE
HELP NEEDED FOR BATHING: A LITTLE
WALKING IN HOSPITAL ROOM: A LITTLE
TOILETING: A LITTLE
HELP NEEDED FOR BATHING: A LITTLE
SUGGESTED CMS G CODE MODIFIER DAILY ACTIVITY: CJ
MOBILITY SCORE: 20
CLIMB 3 TO 5 STEPS WITH RAILING: A LITTLE
SUGGESTED CMS G CODE MODIFIER DAILY ACTIVITY: CJ
TOILETING: A LITTLE
DRESSING REGULAR UPPER BODY CLOTHING: A LITTLE
STANDING UP FROM CHAIR USING ARMS: A LITTLE

## 2024-03-26 ASSESSMENT — ENCOUNTER SYMPTOMS
FOCAL WEAKNESS: 0
HEADACHES: 0
ABDOMINAL PAIN: 1
NECK PAIN: 0
TINGLING: 0
FEVER: 0
DIZZINESS: 0
PHOTOPHOBIA: 0
MYALGIAS: 0
CHILLS: 0
PALPITATIONS: 0
NAUSEA: 0
WEAKNESS: 0
SHORTNESS OF BREATH: 0
DOUBLE VISION: 0
SPEECH CHANGE: 0
DIAPHORESIS: 0
BACK PAIN: 0
EYE PAIN: 1
BLURRED VISION: 0
VOMITING: 0
TREMORS: 0
SENSORY CHANGE: 0

## 2024-03-26 ASSESSMENT — PAIN DESCRIPTION - PAIN TYPE
TYPE: ACUTE PAIN

## 2024-03-26 ASSESSMENT — GAIT ASSESSMENTS
DEVIATION: NO DEVIATION
GAIT LEVEL OF ASSIST: SUPERVISED
DISTANCE (FEET): 100

## 2024-03-26 ASSESSMENT — ACTIVITIES OF DAILY LIVING (ADL): TOILETING: INDEPENDENT

## 2024-03-26 ASSESSMENT — FIBROSIS 4 INDEX: FIB4 SCORE: 8.64

## 2024-03-26 NOTE — ASSESSMENT & PLAN NOTE
VTE prophylaxis initially contraindicated secondary to elevated bleeding risk.  3/26 Trauma surveillance venous duplex ultrasonography ordered.

## 2024-03-26 NOTE — ASSESSMENT & PLAN NOTE
Chronic condition treated with LEVEMIR & OZEMPIC.  Holding maintenance medication during acute traumatic illness.  Insulin sliding scale.

## 2024-03-26 NOTE — PROGRESS NOTES
Case discussed with Cardiology NETTA.  Once discharged, the patient will walk over to the heart center tomorrow for an appointment at 0930 to get her home cardiac event monitor placed.  She will follow up with her established outpatient cardiologist on Thursday.

## 2024-03-26 NOTE — CARE PLAN
The patient is Stable - Low risk of patient condition declining or worsening    Shift Goals  Clinical Goals: Stable Hmg, restart ASA and plavix per cardiology, pain control, transfer out of ICU  Patient Goals: Eat, stable pain, go home  Family Goals: [place]    Progress made toward(s) clinical / shift goals:  DC planning       Problem: Knowledge Deficit - Standard  Goal: Patient and family/care givers will demonstrate understanding of plan of care, disease process/condition, diagnostic tests and medications  Outcome: Progressing     Problem: Pain - Standard  Goal: Alleviation of pain or a reduction in pain to the patient’s comfort goal  Outcome: Progressing     Problem: Skin Integrity  Goal: Skin integrity is maintained or improved  Outcome: Progressing     Problem: Fall Risk  Goal: Patient will remain free from falls  Outcome: Progressing     Problem: Hemodynamics  Goal: Patient's hemodynamics, fluid balance and neurologic status will be stable or improve  Outcome: Progressing       Patient is not progressing towards the following goals:

## 2024-03-26 NOTE — ASSESSMENT & PLAN NOTE
Hx of stent placement on 3/5/2024.   EKG completed.  ECHO with LVEF 68%  Continuous cardiac monitoring.  Cardiology consultation: continue DAPT & discharge with cardiac event monitor.

## 2024-03-26 NOTE — DISCHARGE INSTRUCTIONS
- Call or seek medical attention for questions or concerns  - Follow up with the [facility] RETURN: PRN  - Follow up with facial surgeon Dr. Alonso Elizabeth  - Follow up with your established cardiologist ASAP  - Follow up with primary care provider within one weeks time  - Resume regular diet  - May take over the counter acetaminophen as needed for pain  - Continue daily over the counter stool softener while on narcotics  - No operation of machinery or motorized vehicles while under the influence of narcotics  - No alcohol, marijuana or illicit drug use while under the influence of narcotics  - In the event of a narcotic overdose naloxone (Narcan) is available without a prescription from any [facility] or [facility]  - No swimming, hot tubs, baths or wound submersion until cleared by outpatient provider. May shower  - No contact sports, strenuous activities, or heavy lifting until cleared by outpatient provider  - If respiratory decompensation, persistent or worsening pain, or signs or symptoms of infection occur seek medical attention

## 2024-03-26 NOTE — CARE PLAN
The patient is Watcher - Medium risk of patient condition declining or worsening    Shift Goals  Clinical Goals: Trend hgb, pain control    Progress made toward(s) clinical / shift goals:    Problem: Knowledge Deficit - Standard  Goal: Patient and family/care givers will demonstrate understanding of plan of care, disease process/condition, diagnostic tests and medications  Outcome: Progressing     Problem: Pain - Standard  Goal: Alleviation of pain or a reduction in pain to the patient’s comfort goal  Outcome: Progressing     Problem: Skin Integrity  Goal: Skin integrity is maintained or improved  Outcome: Progressing     Problem: Fall Risk  Goal: Patient will remain free from falls  Outcome: Progressing     Problem: Hemodynamics  Goal: Patient's hemodynamics, fluid balance and neurologic status will be stable or improve  Outcome: Progressing

## 2024-03-26 NOTE — PROGRESS NOTES
Bedside report received from off going RN/tech: Melissa HUSAIN, assumed care of patient.     Fall Risk Score: MODERATE RISK  Fall risk interventions in place: Place yellow fall risk ID band on patient, Provide patient/family education based on risk assessment, Educate patient/family to call staff for assistance when getting out of bed, Place fall precaution signage outside patient door, Place patient in room close to nursing station, Utilize bed/chair fall alarm, and Bed alarm connected correctly  Bed type: Regular and Low air loss (Kem Score less than 17 interventions in place)  Patient on cardiac monitor: Yes  IVF/IV medications: Not Applicable   Oxygen: Room Air  Bedside sitter: Not Applicable   Isolation: Not applicable

## 2024-03-26 NOTE — DIETARY
"Nutrition services: Day 1 of admit.  Capri Quinonez is a 73 y.o. female with admitting DX of Trauma   Consult received for malnutrition screening tool score of 2 for 2-13lb wt loss x 1 month and poor PO r/t ozempic.    Visited pt at bedside. Pt said that she has not eaten since yesterday morning. Pt said that she received lunch today  but wasn't able to eat a whole bunch of it. Pt said that UBW is 175. Pt stated that she then started ozempic 2 years ago she has lost some wt. However, pt also stated that she doesn't know of any recent wt loss. Pt said that she uses ozempic 1x/week.   Per nutrition focused physical exam, pt w/ moderate scooping in temporalis area and mild depressions in buccal region.     Assessment:  Height: 157.5 cm (5' 2\")  Weight: 55.7 kg (122 lb 12.7 oz) via bed scale on 3/26  Body mass index is 22.46 kg/m²., BMI classification: WNL  Diet/Intake: Regular; pt was NPO/CLD prior. No current PO documentation per flowsheets.     Evaluation:   Per H&P: \" female who experienced a syncopal related fall and struck her head and chest.  She was evaluated at [facility] where CT scans showed orbital fractures and a grade 1 spleen injury.  She is on ASA and Plavix for cardiac stents placed recently at [facility].  EKG shows an age indeterminate anteroseptal infarct. \"  Per NP note this am, \"Facial surgery consulted at 1004 this AM, formal consult to follow...transfer to telemetry   Labs 3/26: K+ 3/1, glu 183; 3/25 A1c 6.1  Meds: Lipitor, insulin (dose not required this am), magnesium sulfate IVPB, zofran PRN, dilaudid PRN, miralax (refused this am), magnesium hydroxide (refused this am), Kdur, senna  Skin: No wounds, no edema  +BM: PTA  Per chat review, pt has lost 8.3% of wt x 5.5 months (not significant)  Wt Readings from Last Encounters:   03/26/24 55.7 kg (122 lb 12.7 oz)   10/11/23 59.4 kg (131 lb)     Malnutrition Risk: Pt meets moderate malnutrition in the context of chronic illness aeb mild fat " loss and moderate muscle loss noted in nutrition focused physical exam.    Recommendations/Plan:   RD to monitor PO intake to determine necessity of nutritional intervention.  Encourage intake of >50%  Document intake of all meals as % taken in ADL's to provide interdisciplinary communication across all shifts.   Monitor weight.  Nutrition rep will continue to see patient for ongoing meal and snack preferences.     RD following.

## 2024-03-26 NOTE — THERAPY
"Occupational Therapy   Initial Evaluation     Patient Name: Capri Quinonez  Age:  73 y.o., Sex:  female  Medical Record #: [id number]  Today's Date: 3/26/2024     Precautions  Precautions: Fall Risk    Assessment  Patient is 73 y.o. female admitted after a syncopal episode in which patient sustained facial fractures, splenic laceration. She had a stent placed 3/5/24. Pt edu on compensatory strategies during ADLs as well as appropriate AE/DME to maximize independence and safety. She is at a supervision level for basic self care, functional mobility, and functional transfers. She denies any further deficits in self care at this time.     Plan    Occupational Therapy Initial Treatment Plan   Duration: Discharge Needs Only    DC Equipment Recommendations: Tub / Shower Seat  Discharge Recommendations: Anticipate that the patient will have no further occupational therapy needs after discharge from the hospital     Subjective    \"I usually do all the shopping and driving.\"     Objective       03/26/24 0820   Prior Living Situation   Prior Services Home-Independent   Housing / Facility 1 Story House   Steps Into Home 1   Bathroom Set up Walk In Shower;Grab Bars;Bathtub / Shower Combination   Equipment Owned Grab Bar(s) In Tub / Shower;Grab Bar(s) By Toilet;Front-Wheel Walker;4-Wheel Walker;Single Point Cane   Lives with - Patient's Self Care Capacity Sibling   Comments Pt lives with her older sister. They share household responsibilities, however Anya primarily takes care of the driving and shopping which her sister cannot do. She reports she has people that can help her with this upon d/c if needed.   Prior Level of ADL Function   Self Feeding Independent   Grooming / Hygiene Independent   Bathing Independent   Dressing Independent   Toileting Independent   Prior Level of IADL Function   Medication Management Independent   Laundry Independent   Kitchen Mobility Independent   Finances Independent   Home Management " Independent   Shopping Independent   Prior Level Of Mobility Independent Without Device in Community;Independent Without Device in Home   Driving / Transportation Driving Independent   Occupation (Pre-Hospital Vocational) Retired Due To Age   Precautions   Precautions Fall Risk   Vitals   Vitals Comments pt with drop in BP after activity, but did not feel symptomatic   Pain 0 - 10 Group   Therapist Pain Assessment Nurse Notified;During Activity  (facial pain and sciatic pain)   Cognition    Cognition / Consciousness WDL   Level of Consciousness Alert   Comments pleasant and cooperative, some word finding difficulty   Active ROM Upper Body   Active ROM Upper Body  WDL   Strength Upper Body   Upper Body Strength  WDL   Coordination Upper Body   Coordination WDL   Balance Assessment   Sitting Balance (Static) Fair +   Sitting Balance (Dynamic) Fair   Standing Balance (Static) Fair   Standing Balance (Dynamic) Fair   Weight Shift Sitting Fair   Weight Shift Standing Fair   Comments no AD   Bed Mobility    Supine to Sit Supervised   Scooting Supervised   Comments flat bed, no rail   ADL Assessment   Grooming Supervision;Seated   Lower Body Dressing Supervision  (don underwear)   Toileting Supervision   Comments educated on use of shower chair upon d/c home as patient had a syncopal episode and would increase safety during showering. pt in agreement   How much help from another person does the patient currently need...   Putting on and taking off regular lower body clothing? 3   Bathing (including washing, rinsing, and drying)? 3   Toileting, which includes using a toilet, bedpan, or urinal? 3   Putting on and taking off regular upper body clothing? 4   Taking care of personal grooming such as brushing teeth? 4   Eating meals? 4   6 Clicks Daily Activity Score 21   Functional Mobility   Sit to Stand Supervised   Bed, Chair, Wheelchair Transfer Supervised   Toilet Transfers Supervised   Mobility EOB>senior>BR>Chair    Comments no AD

## 2024-03-26 NOTE — PROGRESS NOTES
Provider SHAHBAZ Manley notified of 2 pt drop in hgb-- no further interventions order. Also discussed k 3.1-- replacement ordered and elevated trop-- repeat draw in 6hrs.

## 2024-03-26 NOTE — PROGRESS NOTES
Trauma / Surgical Daily Progress Note    Date of Service  3/26/2024    Chief Complaint  73 y.o. female admitted 3/25/2024 with left orbital fractures, nasal fracture, grade I spleen laceration, & syncope.     Interval Events  Admit to TICU yesterday.   Cardiology recs continuing cardiac monitoring, obtain echocardiogram, & discharge with event monitor.  DAPT resumed this AM.  Hemoglobin stable this AM at 11.9 (previously 11.7)  Facial surgery consulted at 1004 this AM, formal consult to follow.     - Transfer to Telemetry  - Continue cardiac monitoring  - Echocardiogram ordered & pending  - Replete with 2g Mag Sulfate & Kdur  - Repeat CBC & CMP in the AM  - Trial clear liquid diet & advance as tolerated  - Mobilize with PT & OT for discharge recs  - Disposition: pending therapy recommendations. Patient will follow up with her established outpatient cardiologist, Dr. Rosie Arango at her previously scheduled appointment on Thursday 3/28    Review of Systems  Review of Systems   Constitutional:  Negative for chills, diaphoresis, fever and malaise/fatigue.   HENT:  Negative for ear discharge, ear pain, hearing loss and tinnitus.    Eyes:  Positive for pain (mild left eye). Negative for blurred vision, double vision and photophobia.   Respiratory:  Negative for shortness of breath.    Cardiovascular:  Negative for chest pain and palpitations.   Gastrointestinal:  Positive for abdominal pain (LUQ). Negative for nausea and vomiting.   Genitourinary: Negative.         Voiding   Musculoskeletal:  Negative for back pain, joint pain, myalgias and neck pain.   Skin: Negative.    Neurological:  Negative for dizziness, tingling, tremors, sensory change, speech change, focal weakness, weakness and headaches.        Vital Signs  Temp:  [35.8 °C (96.4 °F)-36.9 °C (98.4 °F)] 35.8 °C (96.5 °F)  Pulse:  [67-79] 75  Resp:  [12-36] 30  BP: ()/(53-77) 96/53  SpO2:  [90 %-100 %] 98 %    Physical Exam  Physical Exam  Constitutional:        General: She is not in acute distress.     Appearance: She is not ill-appearing.   HENT:      Head: Normocephalic and atraumatic.      Right Ear: External ear normal.      Left Ear: External ear normal.      Nose:      Comments: Nasal bridge tenderness.     Mouth/Throat:      Mouth: Mucous membranes are moist.      Pharynx: Oropharynx is clear.   Eyes:      Extraocular Movements: Extraocular movements intact.      Pupils: Pupils are equal, round, and reactive to light.      Comments: Left periorbital edema, ecchymosis, & tenderness.   Cardiovascular:      Rate and Rhythm: Normal rate and regular rhythm.      Heart sounds: Murmur heard.   Pulmonary:      Effort: Pulmonary effort is normal. No respiratory distress.      Breath sounds: Normal breath sounds.   Chest:      Chest wall: No tenderness.   Abdominal:      General: Bowel sounds are normal. There is no distension.      Palpations: Abdomen is soft.      Tenderness: There is abdominal tenderness (mild LUQ). There is no guarding.   Musculoskeletal:         General: No swelling, tenderness or deformity.      Cervical back: Normal range of motion and neck supple. No tenderness.      Right lower leg: No edema.      Left lower leg: No edema.   Skin:     General: Skin is warm and dry.      Capillary Refill: Capillary refill takes less than 2 seconds.   Neurological:      General: No focal deficit present.      Mental Status: She is alert and oriented to person, place, and time. Mental status is at baseline.      GCS: GCS eye subscore is 4. GCS verbal subscore is 5. GCS motor subscore is 6.      Sensory: No sensory deficit.      Motor: No weakness.      Comments: 5/5 strength bilateral upper extremities with strong grasp bilaterally.   5/5 strength bilateral lower extremities with strong plantar & dorsiflexion bilaterally.   Psychiatric:         Behavior: Behavior normal. Behavior is cooperative.       Laboratory  Recent Results (from the past 24 hour(s))   BASIC  METABOLIC PANEL    Collection Time: 03/25/24  4:05 PM   Result Value Ref Range    Sodium 138 136 - 145 mmol/L    Potassium 3.5 3.5 - 5.3 mmol/L    Chloride 103 98 - 110 mmol/L    Co2 32.0 (H) 20.0 - 31.0 mmol/L    Bun 13.0 6.0 - 24.0 mg/dL    Creatinine 1.00 0.50 - 1.00 mg/dL    Glucose, Bld 187 (H) 70 - 140 mg/dL    Calcium 10.3 8.6 - 10.4 mg/dL    eGFR 60.4 (L) >90.0 mL/min/1.73m*2    Anion Gap 3.0 6 - 19 mmol/L    Bun-Creatinine Ratio 13.0 10.0 - 20.0   CBC WITH DIFFERENTIAL    Collection Time: 03/25/24  4:05 PM   Result Value Ref Range    WBC 9.40 3.40 - 10.00 10*3/uL    RBC 4.53 3.90 - 5.03 10*6/uL    Hemoglobin 13.4 12.0 - 15.5 g/dL    Hematocrit 39.9 34.9 - 44.5 %    MCV 88.1 81.6 - 98.3 fL    MCH 29.5 27.5 - 33.2 pg    MCHC 33.5 33.4 - 35.5 g/dL    RDW 16.3 (H) 11.8 - 15.6 %    Platelet Count 188 150 - 450 10*3/uL    MPV 10.1 7.4 - 11.0 fL    Neutrophils-Polys 75.1 (H) 42.0 - 75.0 %    Lymphocytes 13.5 (L) 22.0 - 44.0 %    Monocytes 7.3 2.0 - 9.0 %    Eosinophils 3.1 1.0 - 7.0 %    Basophils 1.0 0.0 - 1.0 %    Neutrophils (Absolute) 7.1 (H) 1.7 - 7.0 10*3/uL    Lymphs (Absolute) 1.3 0.9 - 2.9 10*3/uL    Monos (Absolute) 0.7 0.3 - 0.9 10*3/uL    Eos (Absolute) 0.3 0.0 - 0.5 10*3/uL    Baso (Absolute) 0.1 0.0 - 0.2 10*3/uL   PLATELET MAPPING WITH BASIC TEG    Collection Time: 03/25/24  7:54 PM   Result Value Ref Range    Reaction Time Initial-R 5.3 4.6 - 9.1 min    React Time Initial Hep 5.1 4.3 - 8.3 min    Clot Kinetics-K 1.1 0.8 - 2.1 min    Clot Angle-Angle 74.4 63.0 - 78.0 degrees    Maximum Clot Strength-MA 64.6 52.0 - 69.0 mm    TEG Functional Fibrinogen(MA) 30.6 15.0 - 32.0 mm    Lysis 30 minutes-LY30 0.1 0.0 - 2.6 %    % Inhibition ADP 19.0 (H) 0.0 - 17.0 %    % Inhibition AA 40.9 (H) 0.0 - 11.0 %    TEG Algorithm Link Algorithm    Prothrombin Time    Collection Time: 03/25/24  7:54 PM   Result Value Ref Range    PT 13.7 12.0 - 14.6 sec    INR 1.04 0.87 - 1.13   COD - Adult (Type and Screen)     Collection Time: 03/25/24  7:54 PM   Result Value Ref Range    ABO Grouping Only O     Rh Grouping Only POS     Antibody Screen-Cod NEG    HEMOGLOBIN A1C    Collection Time: 03/25/24  7:54 PM   Result Value Ref Range    Glycohemoglobin 6.1 (H) 4.0 - 5.6 %    Est Avg Glucose 128 mg/dL   DIAGNOSTIC ALCOHOL    Collection Time: 03/25/24  7:54 PM   Result Value Ref Range    Diagnostic Alcohol <10.1 <10.1 mg/dL   CBC WITHOUT DIFFERENTIAL    Collection Time: 03/25/24  7:54 PM   Result Value Ref Range    WBC 10.4 4.8 - 10.8 K/uL    RBC 4.70 4.20 - 5.40 M/uL    Hemoglobin 13.7 12.0 - 16.0 g/dL    Hematocrit 41.1 37.0 - 47.0 %    MCV 87.4 81.4 - 97.8 fL    MCH 29.1 27.0 - 33.0 pg    MCHC 33.3 32.2 - 35.5 g/dL    RDW 49.6 35.9 - 50.0 fL    Platelet Count 191 164 - 446 K/uL    MPV 12.3 9.0 - 12.9 fL   Comp Metabolic Panel    Collection Time: 03/25/24  7:54 PM   Result Value Ref Range    Sodium 138 135 - 145 mmol/L    Potassium 3.1 (L) 3.6 - 5.5 mmol/L    Chloride 99 96 - 112 mmol/L    Co2 27 20 - 33 mmol/L    Anion Gap 12.0 7.0 - 16.0    Glucose 146 (H) 65 - 99 mg/dL    Bun 16 8 - 22 mg/dL    Creatinine 0.79 0.50 - 1.40 mg/dL    Calcium 9.7 8.5 - 10.5 mg/dL    Correct Calcium 9.7 8.5 - 10.5 mg/dL    AST(SGOT) 301 (H) 12 - 45 U/L    ALT(SGPT) 137 (H) 2 - 50 U/L    Alkaline Phosphatase 504 (H) 30 - 99 U/L    Total Bilirubin 2.1 (H) 0.1 - 1.5 mg/dL    Albumin 4.0 3.2 - 4.9 g/dL    Total Protein 7.9 6.0 - 8.2 g/dL    Globulin 3.9 (H) 1.9 - 3.5 g/dL    A-G Ratio 1.0 g/dL   HCG QUAL SERUM    Collection Time: 03/25/24  7:54 PM   Result Value Ref Range    Beta-Hcg Qualitative Serum Negative Negative   APTT    Collection Time: 03/25/24  7:54 PM   Result Value Ref Range    APTT 31.8 24.7 - 36.0 sec   ESTIMATED GFR    Collection Time: 03/25/24  7:54 PM   Result Value Ref Range    GFR (CKD-EPI) 80 >60 mL/min/1.73 m 2   EKG (IP)    Collection Time: 03/25/24  8:08 PM   Result Value Ref Range    Report       [facility]  Emergency Dept.    Test Date:  2024  Pt Name:    RAHUL CAZARES              Department: ER  MRN:        [id number]                      Room:       T910  Gender:     Female                       Technician: [id number]  :        1950                   Requested By:JACKY LOYOLA  Order #:    [id number]                    Reading MD: ALLYSON DUBON. AMD    Measurements  Intervals                                Axis  Rate:       76                           P:          59  CO:         130                          QRS:        63  QRSD:       88                           T:          102  QT:         413  QTc:        465    Interpretive Statements  Sinus rhythm  LAE, consider biatrial enlargement  Anteroseptal infarct, age indeterminate  Lateral leads are also involved  No previous ECG available for comparison  Electronically Signed On 2024 21:54:30 PDT by ALLYSON DUBON. AMD     POCT glucose device results    Collection Time: 24  8:34 PM   Result Value Ref Range    POC Glucose, Blood 137 (H) 65 - 99 mg/dL   EKG    Collection Time: 24  8:39 PM   Result Value Ref Range    Report       [facility]    Test Date:  2024  Pt Name:    RAHUL CAZARES              Department: ER  MRN:        [id number]                      Room:       T910  Gender:     Female                       Technician: [id number]  :        1950                   Requested By:ALLYSON DUBON  Order #:    [id number]                    Reading MD: Vicki Silva MD    Measurements  Intervals                                Axis  Rate:       74                           P:          49  CO:         124                          QRS:        39  QRSD:       98                           T:          107  QT:         421  QTc:        467    Interpretive Statements  Sinus rhythm  Probable left atrial enlargement  Borderline repolarization abnormality  Electronically Signed On 2024 20:50:44 PDT by Vicki Silva MD      Hemoglobin - Q6 hours x4    Collection Time: 03/25/24  8:47 PM   Result Value Ref Range    Hemoglobin 11.7 (L) 12.0 - 16.0 g/dL   TROPONIN    Collection Time: 03/25/24  8:47 PM   Result Value Ref Range    Troponin T 40 (H) 6 - 19 ng/L   MAGNESIUM    Collection Time: 03/25/24  8:47 PM   Result Value Ref Range    Magnesium 1.9 1.5 - 2.5 mg/dL   PHOSPHORUS    Collection Time: 03/25/24  8:47 PM   Result Value Ref Range    Phosphorus 2.7 2.5 - 4.5 mg/dL   ABO Rh Confirm    Collection Time: 03/25/24 10:02 PM   Result Value Ref Range    ABO Rh Confirm O POS    POCT glucose device results    Collection Time: 03/26/24 12:20 AM   Result Value Ref Range    POC Glucose, Blood 178 (H) 65 - 99 mg/dL   CBC with Differential: Tomorrow AM    Collection Time: 03/26/24  2:00 AM   Result Value Ref Range    WBC 8.4 4.8 - 10.8 K/uL    RBC 3.98 (L) 4.20 - 5.40 M/uL    Hemoglobin 11.9 (L) 12.0 - 16.0 g/dL    Hematocrit 34.2 (L) 37.0 - 47.0 %    MCV 85.9 81.4 - 97.8 fL    MCH 29.9 27.0 - 33.0 pg    MCHC 34.8 32.2 - 35.5 g/dL    RDW 49.0 35.9 - 50.0 fL    Platelet Count 169 164 - 446 K/uL    MPV 12.4 9.0 - 12.9 fL    Neutrophils-Polys 81.20 (H) 44.00 - 72.00 %    Lymphocytes 11.00 (L) 22.00 - 41.00 %    Monocytes 5.60 0.00 - 13.40 %    Eosinophils 1.30 0.00 - 6.90 %    Basophils 0.50 0.00 - 1.80 %    Immature Granulocytes 0.40 0.00 - 0.90 %    Nucleated RBC 0.00 0.00 - 0.20 /100 WBC    Neutrophils (Absolute) 6.85 1.82 - 7.42 K/uL    Lymphs (Absolute) 0.93 (L) 1.00 - 4.80 K/uL    Monos (Absolute) 0.47 0.00 - 0.85 K/uL    Eos (Absolute) 0.11 0.00 - 0.51 K/uL    Baso (Absolute) 0.04 0.00 - 0.12 K/uL    Immature Granulocytes (abs) 0.03 0.00 - 0.11 K/uL    NRBC (Absolute) 0.00 K/uL   Comp Metabolic Panel (CMP): Tomorrow AM    Collection Time: 03/26/24  2:00 AM   Result Value Ref Range    Sodium 139 135 - 145 mmol/L    Potassium 3.1 (L) 3.6 - 5.5 mmol/L    Chloride 100 96 - 112 mmol/L    Co2 27 20 - 33 mmol/L    Anion Gap 12.0 7.0 - 16.0     Glucose 183 (H) 65 - 99 mg/dL    Bun 17 8 - 22 mg/dL    Creatinine 0.71 0.50 - 1.40 mg/dL    Calcium 9.2 8.5 - 10.5 mg/dL    Correct Calcium 9.4 8.5 - 10.5 mg/dL    AST(SGOT) 238 (H) 12 - 45 U/L    ALT(SGPT) 134 (H) 2 - 50 U/L    Alkaline Phosphatase 431 (H) 30 - 99 U/L    Total Bilirubin 1.6 (H) 0.1 - 1.5 mg/dL    Albumin 3.7 3.2 - 4.9 g/dL    Total Protein 6.9 6.0 - 8.2 g/dL    Globulin 3.2 1.9 - 3.5 g/dL    A-G Ratio 1.2 g/dL   MAGNESIUM    Collection Time: 03/26/24  2:00 AM   Result Value Ref Range    Magnesium 1.9 1.5 - 2.5 mg/dL   PHOSPHORUS    Collection Time: 03/26/24  2:00 AM   Result Value Ref Range    Phosphorus 3.1 2.5 - 4.5 mg/dL   TROPONIN    Collection Time: 03/26/24  2:00 AM   Result Value Ref Range    Troponin T 34 (H) 6 - 19 ng/L   ESTIMATED GFR    Collection Time: 03/26/24  2:00 AM   Result Value Ref Range    GFR (CKD-EPI) 90 >60 mL/min/1.73 m 2   AMMONIA    Collection Time: 03/26/24  5:33 AM   Result Value Ref Range    Ammonia 22 11 - 45 umol/L   POCT glucose device results    Collection Time: 03/26/24  5:33 AM   Result Value Ref Range    POC Glucose, Blood 129 (H) 65 - 99 mg/dL   Hemoglobin - Q6 hours x4    Collection Time: 03/26/24  8:00 AM   Result Value Ref Range    Hemoglobin 11.0 (L) 12.0 - 16.0 g/dL       Fluids    Intake/Output Summary (Last 24 hours) at 3/26/2024 1008  Last data filed at 3/26/2024 0800  Gross per 24 hour   Intake 982.68 ml   Output 500 ml   Net 482.68 ml       Core Measures & Quality Metrics  Labs reviewed, Medications reviewed, Radiology images reviewed and EKG reviewed  Franklin catheter: No Franklin      DVT Prophylaxis: Not indicated at this time, ambulatory  DVT prophylaxis - mechanical: SCDs  Ulcer prophylaxis: Not indicated        RAP Score Total: 5    CAGE Results: negative Blood Alcohol>0.08: no     Assessment/Plan  * Trauma- (present on admission)  Assessment & Plan  Syncopal fall.  Trauma Yellow Transfer Activation from [facility] in [facility]  [place].  Raza Best MD. Trauma Surgery.    Splenic laceration, initial encounter- (present on admission)  Assessment & Plan  Grade I splenic laceration. Small perisplenic hematoma.  Serial hemograms and abdominal exams.    Hypokalemia  Assessment & Plan  Replete & monitor.    Diabetic neuropathy associated with type 2 diabetes mellitus (HCC)- (present on admission)  Assessment & Plan  Chronic condition treated with gabapentin.  3/26 Resumed maintenance medication.    Type 2 diabetes mellitus, with long-term current use of insulin (HCC)- (present on admission)  Assessment & Plan  Chronic condition treated with LEVEMIR & OZEMPIC.  Holding maintenance medication during acute traumatic illness.  Insulin sliding scale.    Gout- (present on admission)  Assessment & Plan  Chronic condition treated with allopurinol.  Resumed maintenance medication on admission.    Hypertension- (present on admission)  Assessment & Plan  Chronic condition treated with Coreg.  Resumed maintenance medication on admission.    Cirrhosis (HCC)- (present on admission)  Assessment & Plan  Noted on sending facility CT scan.    Dyslipidemia- (present on admission)  Assessment & Plan  Chronic condition treated with atorvastatin.  Resumed maintenance medication on admission.    Antiplatelet or antithrombotic long-term use- (present on admission)  Assessment & Plan  On Plavix and aspirin.  Admit TEG with platelet mapping completed, transfusion not indicated.  3/26 DAPT resumed.    Contraindication to deep vein thrombosis (DVT) prophylaxis- (present on admission)  Assessment & Plan  VTE prophylaxis initially contraindicated secondary to elevated bleeding risk.  3/26 Trauma surveillance venous duplex ultrasonography ordered.    Syncope- (present on admission)  Assessment & Plan  Hx of stent placement on 3/5/2024.   EKG completed.  ECHO pending.  Continuous cardiac monitoring.  Cardiology consultation: continue DAPT & discharge with cardiac event  monitor.    Facial fracture due to fall, closed, initial encounter (HCC)- (present on admission)  Assessment & Plan  Medial left orbital wall fracture.  Minimally displaced left orbital fracture  Minimally displaced left nasal bone fracture  Definitive plan pending.  Alonso Elizabeth MD. Plastic Surgeon. [facility].      Mental status adequate for full examination?: Yes    Spine cleared (radiologically and/or clinically): Yes    All current laboratory studies/radiology exams reviewed: Yes    Medications reconciliation has been reviewed: Yes    Completed Consultations:  None     Pending Consultations:  Dr. Alonso Elizbaeth MD, Facial Surgery - consulted at 1004, received confirmation at 1007    Newly identified diagnoses, injuries and/or co-morbidities:  None noted at time of exam.    Discussed patient condition with RN, Pharmacy, , Charge nurse / hot rounds, Patient, and trauma surgery, Dr. Werner & Dr. Alonso Elizabeth.

## 2024-03-26 NOTE — ASSESSMENT & PLAN NOTE
Syncopal fall.  Trauma Yellow Transfer Activation from [facility] in [place], [place].  Raza Best MD. Trauma Surgery.

## 2024-03-26 NOTE — ASSESSMENT & PLAN NOTE
On Plavix and aspirin.  Admit TEG with platelet mapping completed, transfusion not indicated.  3/26 DAPT resumed.

## 2024-03-26 NOTE — PROGRESS NOTES
4 Eyes Skin Assessment Completed by SHANEKA, RN and ROMA RN.  PT ARRIVAL 2015, CSPINE PRECAUTIONS, HR78 02 92% ON ra, rr 20, 55.7 kg, TEMP 96.8 F, /70  BELONGINGS: 1 PAIR PANTIES, 1 HAIR SCRUNCHIE       Head Bruising LEFT EYE  Ears WDL  Nose WDL  Mouth WDL  Neck WDL, HARD C COLLAR IN PLACE   Breast/Chest WDL,   Shoulder Blades WDL  Spine WDL  (R) Arm/Elbow/Hand WDL, OLD SCARS   (L) Arm/Elbow/Hand Scar  Abdomen WDL, OLD SCAR   Groin WDL  Scrotum/Coccyx/Buttocks Redness and Blanching  (R) Leg Bruising  (L) Leg WDL  (R) Heel/Foot/Toe WDL  (L) Heel/Foot/Toe WDL          Devices In Places ECG, Blood Pressure Cuff, Pulse Ox, and SCD's      Interventions In Place Sacral Mepilex and Q2 Turns    Possible Skin Injury Yes    Pictures Uploaded Into Epic Yes  Wound Consult Placed N/A  RN Wound Prevention Protocol Ordered Yes

## 2024-03-26 NOTE — CARE PLAN
The patient is Stable - Low risk of patient condition declining or worsening    Shift Goals  Clinical Goals: Stable Hmg, restart ASA and plavix per cardiology, pain control, transfer out of ICU  Patient Goals: Eat, stable pain, go home  Family Goals: [place]    Progress made toward(s) clinical / shift goals:    Problem: Knowledge Deficit - Standard  Goal: Patient and family/care givers will demonstrate understanding of plan of care, disease process/condition, diagnostic tests and medications  Outcome: Progressing     Problem: Pain - Standard  Goal: Alleviation of pain or a reduction in pain to the patient’s comfort goal  Outcome: Progressing     Problem: Fall Risk  Goal: Patient will remain free from falls  Outcome: Progressing       Patient is not progressing towards the following goals:

## 2024-03-26 NOTE — PROGRESS NOTES
Patient denies any neck pain at this time.    APRN at bedside to remove cervical collar. No complaints of neck pain from patient.

## 2024-03-26 NOTE — ASSESSMENT & PLAN NOTE
Medial left orbital wall fracture.  Minimally displaced left orbital fracture  Minimally displaced left nasal bone fracture  Non-operative management.  Alonso Elizabeth MD. Plastic Surgeon. [facility].

## 2024-03-26 NOTE — ED PROVIDER NOTES
ED Provider Note    CHIEF COMPLAINT  No chief complaint on file.  Splenic injury, facial fracture, syncope.    EXTERNAL RECORDS REVIEWED  Reviewed records from the transferring hospital.    HPI/ROS  LIMITATION TO HISTORY   Select: : None  OUTSIDE HISTORIAN(S):  Spoke with the transferring paramedic.    Capri Quinonez is a 73 y.o. female who presents \to the emergency department transferred from [facility].  Patient had a syncopal episode fell striking her face and injuring the left side of her lower chest and abdomen.  She was evaluated there with a head CT which did not show an intracranial abnormality decision facial fractures, plain radiographs of the cervical spine and chest were obtained and she received a CT of the abdomen pelvis showed a splenic laceration.  She is on dual antiplatelet therapy consisting of Plavix and aspirin she was sent here for further workup and treatment.    She does complain of neck pain she denies any back pain.  Denies any numbness or tingling or weakness.  Denies a previous MI states that the blockages were found during her workup and she had a stent placement.    PAST MEDICAL HISTORY   CAD status postcardiac stent placement  High cholesterol  Tobacco use  Diabetes  Hypertension    SURGICAL HISTORY  patient denies any surgical history    FAMILY HISTORY  No family history on file.    SOCIAL HISTORY  Social History     Tobacco Use    Smoking status: Every Day     Types: Cigarettes    Smokeless tobacco: Never   Vaping Use    Vaping Use: Never used   Substance and Sexual Activity    Alcohol use: Not Currently    Drug use: Never    Sexual activity: Not on file       CURRENT MEDICATIONS  Home Medications       Reviewed by Swathi Rojas R.N. (Registered Nurse) on 03/25/24 at 2155  Med List Status: Complete     Medication Last Dose Status   allopurinol (ZYLOPRIM) 100 MG Tab 3/25/2024 Active   aspirin 81 MG EC tablet 3/25/2024 Active   atorvastatin (LIPITOR) 80 MG tablet  "3/25/2024 Active   carvedilol (COREG) 12.5 MG Tab 3/25/2024 Active   clopidogrel (PLAVIX) 75 MG Tab 3/25/2024 Active   colchicine (COLCRYS) 0.6 MG Tab UNKN Active   famotidine (PEPCID) 20 MG Tab UNKN Active   gabapentin (NEURONTIN) 300 MG Cap 3/25/2024 Active   glucose blood (ONETOUCH VERIO) strip 3/24/2024 Active   insulin detemir (LEVEMIR FLEXPEN) 100 UNIT/ML injection PEN 3/24/2024 Active   LORazepam (ATIVAN) 0.5 MG Tab UNKWN Active   Semaglutide, 1 MG/DOSE, (OZEMPIC, 1 MG/DOSE,) 2 MG/1.5ML Solution Pen-injector 3/23/2024 Active                    ALLERGIES  Allergies   Allergen Reactions    Atorvastatin Unspecified    Hydrocodone-Acetaminophen Nausea and Unspecified       PHYSICAL EXAM  VITAL SIGNS: BP (!) 140/58   Pulse 77   Temp 36.9 °C (98.4 °F) (Temporal)   Resp 16   Ht 1.575 m (5' 2\")   Wt 61.2 kg (135 lb)   SpO2 94% Comment: RA  BMI 24.69 kg/m²    Constitutional: Awake alert nontoxic. no acute respiratory distress  HENT: Normocephalic, ecchymosis and swelling on left side of the face.  Tenderness around the eye.  Eyes: PERRL, EOMI, Conjunctiva normal, No discharge.  She is here with eyes clear extract muscles are intact no clinical evidence of entrapment.  Vision is subjectively normal  Neck: Midline cervical spine tenderness without step-offs  Cardiovascular: Normal heart rate, Normal rhythm, No murmurs, No rubs, No gallops.   Thorax & Lungs: Normal breath sounds, No respiratory distress, No wheezing, left lower chest wall tenderness  Abdomen: Left upper quadrant abdominal tenderness  Skin: Warm, Dry, No erythema, No rash.   Back: No T or L-spine trauma on exam.  No tenderness.  Musculoskeletal: Good range of motion in all major joints.  Neurologic: Alert, Normal motor function, Normal sensory function, No focal deficits noted.           DIAGNOSTIC STUDIES / PROCEDURES    Results for orders placed or performed during the hospital encounter of 03/25/24   PLATELET MAPPING WITH BASIC TEG   Result " Value Ref Range    Reaction Time Initial-R 5.3 4.6 - 9.1 min    React Time Initial Hep 5.1 4.3 - 8.3 min    Clot Kinetics-K 1.1 0.8 - 2.1 min    Clot Angle-Angle 74.4 63.0 - 78.0 degrees    Maximum Clot Strength-MA 64.6 52.0 - 69.0 mm    TEG Functional Fibrinogen(MA) 30.6 15.0 - 32.0 mm    Lysis 30 minutes-LY30 0.1 0.0 - 2.6 %    % Inhibition ADP 19.0 (H) 0.0 - 17.0 %    % Inhibition AA 40.9 (H) 0.0 - 11.0 %    TEG Algorithm Link Algorithm    Prothrombin Time   Result Value Ref Range    PT 13.7 12.0 - 14.6 sec    INR 1.04 0.87 - 1.13   COD - Adult (Type and Screen)   Result Value Ref Range    ABO Grouping Only O     Rh Grouping Only POS     Antibody Screen-Cod NEG    HEMOGLOBIN A1C   Result Value Ref Range    Glycohemoglobin 6.1 (H) 4.0 - 5.6 %    Est Avg Glucose 128 mg/dL   DIAGNOSTIC ALCOHOL   Result Value Ref Range    Diagnostic Alcohol <10.1 <10.1 mg/dL   CBC WITHOUT DIFFERENTIAL   Result Value Ref Range    WBC 10.4 4.8 - 10.8 K/uL    RBC 4.70 4.20 - 5.40 M/uL    Hemoglobin 13.7 12.0 - 16.0 g/dL    Hematocrit 41.1 37.0 - 47.0 %    MCV 87.4 81.4 - 97.8 fL    MCH 29.1 27.0 - 33.0 pg    MCHC 33.3 32.2 - 35.5 g/dL    RDW 49.6 35.9 - 50.0 fL    Platelet Count 191 164 - 446 K/uL    MPV 12.3 9.0 - 12.9 fL   Comp Metabolic Panel   Result Value Ref Range    Sodium 138 135 - 145 mmol/L    Potassium 3.1 (L) 3.6 - 5.5 mmol/L    Chloride 99 96 - 112 mmol/L    Co2 27 20 - 33 mmol/L    Anion Gap 12.0 7.0 - 16.0    Glucose 146 (H) 65 - 99 mg/dL    Bun 16 8 - 22 mg/dL    Creatinine 0.79 0.50 - 1.40 mg/dL    Calcium 9.7 8.5 - 10.5 mg/dL    Correct Calcium 9.7 8.5 - 10.5 mg/dL    AST(SGOT) 301 (H) 12 - 45 U/L    ALT(SGPT) 137 (H) 2 - 50 U/L    Alkaline Phosphatase 504 (H) 30 - 99 U/L    Total Bilirubin 2.1 (H) 0.1 - 1.5 mg/dL    Albumin 4.0 3.2 - 4.9 g/dL    Total Protein 7.9 6.0 - 8.2 g/dL    Globulin 3.9 (H) 1.9 - 3.5 g/dL    A-G Ratio 1.0 g/dL   HCG QUAL SERUM   Result Value Ref Range    Beta-Hcg Qualitative Serum Negative  Negative   APTT   Result Value Ref Range    APTT 31.8 24.7 - 36.0 sec   Hemoglobin - Q6 hours x4   Result Value Ref Range    Hemoglobin 11.7 (L) 12.0 - 16.0 g/dL   TROPONIN   Result Value Ref Range    Troponin T 40 (H) 6 - 19 ng/L   ESTIMATED GFR   Result Value Ref Range    GFR (CKD-EPI) 80 >60 mL/min/1.73 m 2   MAGNESIUM   Result Value Ref Range    Magnesium 1.9 1.5 - 2.5 mg/dL   PHOSPHORUS   Result Value Ref Range    Phosphorus 2.7 2.5 - 4.5 mg/dL   EKG (IP)   Result Value Ref Range    Report       [facility] Emergency Dept.    Test Date:  2024  Pt Name:    RAHUL CAZARES              Department: ER  MRN:        [id number]                      Room:       T0  Gender:     Female                       Technician: [id number]  :        1950                   Requested By:JACKY LOYOLA  Order #:    [id number]                    Reading MD: ALLYSON DUBON. AMD    Measurements  Intervals                                Axis  Rate:       76                           P:          59  CO:         130                          QRS:        63  QRSD:       88                           T:          102  QT:         413  QTc:        465    Interpretive Statements  Sinus rhythm  LAE, consider biatrial enlargement  Anteroseptal infarct, age indeterminate  Lateral leads are also involved  No previous ECG available for comparison  Electronically Signed On 2024 21:54:30 PDT by ALLYSON DUBON. AMD     EKG   Result Value Ref Range    Report       [facility]    Test Date:  2024  Pt Name:    RAHUL CAZARES              Department: ER  MRN:        [id number]                      Room:       T910  Gender:     Female                       Technician: [id number]  :        1950                   Requested By:ALLYSON DUBON  Order #:    [id number]                    Reading MD: Vicki Silva MD    Measurements  Intervals                                Axis  Rate:       74                            P:          49  OK:         124                          QRS:        39  QRSD:       98                           T:          107  QT:         421  QTc:        467    Interpretive Statements  Sinus rhythm  Probable left atrial enlargement  Borderline repolarization abnormality  Electronically Signed On 03- 20:50:44 PDT by Vicki Silva MD     POCT glucose device results   Result Value Ref Range    POC Glucose, Blood 137 (H) 65 - 99 mg/dL        RADIOLOGY  I have independently interpreted the diagnostic imaging associated with this visit and am waiting the final reading from the radiologist.     Radiologist interpretation:     CT-CSPINE WITHOUT PLUS RECONS   Final Result         1.  No acute traumatic bony injury of the cervical spine is apparent.   2.  Atherosclerosis      CT-MAXILLOFACIAL W/O PLUS RECONS   Final Result         1.  Medial left orbital wall fracture.   2.  Minimally displaced left orbital fracture   3.  Minimally displaced left nasal bone fracture   4.  Left intraorbital extraconal air   5.  Opacification left maxillary sinus, density favors intrasinus hemorrhage      DX-CHEST-LIMITED (1 VIEW)   Final Result         1.  No focal infiltrates.   2.  Atherosclerosis   3.  Perihilar interstitial prominence and bronchial wall cuffing, appearance suggests changes of underlying bronchial inflammation, consider bronchitis.      OUTSIDE IMAGES-DX CHEST   Final Result      OUTSIDE IMAGES-DX CERVICAL SPINE   Final Result      OUTSIDE IMAGES-CT HEAD   Final Result      OUTSIDE IMAGES-CT ABDOMEN /PELVIS   Final Result      US-TRAUMA VEIN SCREEN LOWER BILAT EXTREMITY    (Results Pending)   EC-ECHOCARDIOGRAM COMPLETE W/O CONT    (Results Pending)         COURSE & MEDICAL DECISION MAKING    ED Observation Status? No; Patient does not meet criteria for ED Observation.     INITIAL ASSESSMENT, COURSE AND PLAN  Care Narrative:   73-year-old female presents to the ED with left sided chest trauma, left-sided  facial trauma and splenic laceration after syncopal episode.    Patient has a history of CAD recent cardiac stent but no recent STEMI.  EKG was obtained and is as above shows anterior Q waves and an infarct of unclear chronicity.  She had a troponin that was able to review at 47 at the other hospital and 40 here.  She is on aspirin currently has a contraindication to more aggressive anticoagulation.  For syncopal episode she will be hospitalized in the care of the trauma for her injuries and possible cardiology consult will be considered.    For her trauma the patient had a head CT which was negative for hemorrhage she did not have a facial CT this will be added and confirms facial fractures.  She does not have clinical evidence of ocular injury or entrapment at this time.  Her neck is not clinically clearable and she had an adequate radiographs at the transferring hospital therefore CT was obtained.  This is negative for fracture.    Chest x-ray did not show any trauma.    Repeat labs will be obtained.    Patient was splenic injury did not find a CT of her abdomen prior to transfer.  She will be admitted to the trauma ICU for her hemorrhage and further workup and treatment.  Cardiology will be consulted by the trauma team.    Patient is hospitalized in critical condition.    Of note she has LFTs elevation of unclear chronicity and so require further workup.    CRITICAL CARE  The very real possibilty of a deterioration of this patient's condition required the highest level of my preparedness for sudden, emergent intervention.  I provided critical care services, which included medication orders, frequent reevaluations of the patient's condition and response to treatment, ordering and reviewing test results, and discussing the case with various consultants.  The critical care time associated with the care of the patient was 40 minutes. Review chart for interventions. This time is exclusive of any other billable  procedures.           ADDITIONAL PROBLEM LIST  CAD status post recent cardiac stent  Dual antiplatelet therapy  Syncopal episode    DISPOSITION AND DISCUSSIONS  I have discussed management of the patient with the following physicians and NETTA's: Trauma surgeon, Dr. Best    Discussion of management with other [facility] or appropriate source(s): Trauma Nurse Practitioner      FINAL DIAGNOSIS  1. Syncope, unspecified syncope type    2. Strain of neck muscle, initial encounter    3. Closed fracture of facial bone, unspecified facial bone, initial encounter ([facility])    4. Laceration of spleen, initial encounter    5. Chest wall contusion, left, initial encounter    6.  Recent cardiac stent placement  7.  Chronic aspirin use  8.  Critical care time 40 minutes no procedures       Electronically signed by: Sixto Lua M.D., 3/25/2024 8:08 PM

## 2024-03-26 NOTE — PROGRESS NOTES
1022 - Report received from Melissa HUSAIN    1125 - Patient arrived to room 836 from TICU. Oriented to room et floor. Placed on telemetry, verified with central monitoring. Allowed time for questions. Call light and belongings within reach, safety measures and fall precautions in place, white board updated.     1908 - Reported off to Cristina HUSAIN at patient's bedside.

## 2024-03-26 NOTE — PROGRESS NOTES
I was called to discuss this patients care with Dr. Best. We discussed the patient's EKG.    This is a 73 year old woman with history of CAD s/p PCI who had a GLF at home after feeling dizzy, found to have spleen laceration.  The patient had PCI at [facility] 3 months ago. EKG shows sinus rhythm, old anterior septal infarct. Recommend continuing DAPT if no contraindications. Obtain echocardiogram and monitor on telemetry.  Recommend event monitor on discharge.    At this time it was deemed no formal in person cardiology consultation was necessary, however if this changes due to changes in patient condition or abnormal test results, please re-consult cardiology.    Electronically Signed by:    Vicki Silva M.D.     3/25/2024    8:28 PM

## 2024-03-26 NOTE — H&P
TRAUMA HISTORY AND PHYSICAL    CHIEF COMPLAINT: Syncopal related fall    HISTORY OF PRESENT ILLNESS: The patient is a 73  year old  female who experienced a syncopal related fall and struck her head and chest.  She was evaluated at [facility] where CT scans showed orbital fractures and a grade 1 spleen injury.  She is on ASA and Plavix for cardiac stents placed recently at [facility].  EKG shows an age indeterminate anteroseptal infarct.  She is hemodynamically stable.  The fall occurred this afternoon.   The patient was triaged as a transfer yellow (partial activation). Now admitted for serial hemoglobin, cardiac monitor.       PAST MEDICAL HISTORY:       PAST SURGICAL HISTORY: patient denies any surgical history     ALLERGIES:   Allergies   Allergen Reactions    Atorvastatin Unspecified    Hydrocodone-Acetaminophen Nausea and Unspecified        CURRENT MEDICATIONS:   Home Medications       Reviewed by Swathi Rojas R.N. (Registered Nurse) on 03/25/24 at 2155  Med List Status: Complete     Medication Last Dose Status   allopurinol (ZYLOPRIM) 100 MG Tab 3/25/2024 Active   aspirin 81 MG EC tablet 3/25/2024 Active   atorvastatin (LIPITOR) 80 MG tablet 3/25/2024 Active   carvedilol (COREG) 12.5 MG Tab 3/25/2024 Active   clopidogrel (PLAVIX) 75 MG Tab 3/25/2024 Active   colchicine (COLCRYS) 0.6 MG Tab UNKN Active   famotidine (PEPCID) 20 MG Tab UNKN Active   gabapentin (NEURONTIN) 300 MG Cap 3/25/2024 Active   glucose blood (ONETOUCH VERIO) strip 3/24/2024 Active   insulin detemir (LEVEMIR FLEXPEN) 100 UNIT/ML injection PEN 3/24/2024 Active   LORazepam (ATIVAN) 0.5 MG Tab UNKWN Active   Semaglutide, 1 MG/DOSE, (OZEMPIC, 1 MG/DOSE,) 2 MG/1.5ML Solution Pen-injector 3/23/2024 Active                    FAMILY HISTORY: No family history on file.     SOCIAL HISTORY:   Social History     Tobacco Use    Smoking status: Every Day     Types: Cigarettes    Smokeless tobacco: Never   Vaping Use    Vaping Use: Never used  "  Substance and Sexual Activity    Alcohol use: Not Currently    Drug use: Never    Sexual activity: Not on file       REVIEW OF SYSTEMS: Comprehensive review of systems is negative with the   exception of the aforementioned HPI, PMH, and PSH elements in accordance with CMS guidelines.     PHYSICAL EXAMINATION:     GENERAL: No distress  VITAL SIGNS: BP (!) 170/109   Pulse 70   Temp 36.9 °C (98.4 °F) (Temporal)   Resp 12   Ht 1.575 m (5' 2\")   Wt 61.2 kg (135 lb)   SpO2 100%   HEAD AND NECK: Pupils:  Equal and Reactive, 3mm  Dentition: Occlusion is adequate  Facial:  periorbital edema  NECK: No JVD. Trachea midline. Cervical tenderness is present  CHEST: Breath sounds are equal. lateral rib tenderness.  CARDIOVASCULAR: Regular rhythm  ABDOMEN: Soft, LUQ tenderness  PELVIS: Stable.  EXTREMITIES: Examination of the upper and lower extremities : No gross long bone or joint deformity.    BACK: No midline stepoffs.  No Tenderness  NEUROLOGIC: Sukh Coma Score is  15 .  No gross motor or sensory deficit.     LABORATORY VALUES:   Recent Labs     03/25/24 1605 03/25/24 1954 03/25/24 2047   WBC 9.40 10.4  --    RBC 4.53 4.70  --    HEMOGLOBIN 13.4 13.7 11.7*   HEMATOCRIT 39.9 41.1  --    MCV 88.1 87.4  --    MCH 29.5 29.1  --    MCHC 33.5 33.3  --    RDW 16.3* 49.6  --    PLATELETCT 188 191  --    MPV 10.1 12.3  --      Recent Labs     03/25/24 1605 03/25/24 1954   SODIUM 138 138   POTASSIUM 3.5 3.1*   CHLORIDE 103 99   CO2 32.0* 27   GLUCOSE 187* 146*   BUN 13.0 16   CREATININE 1.00 0.79   CALCIUM 10.3 9.7     Recent Labs     03/25/24 1954   ASTSGOT 301*   ALTSGPT 137*   TBILIRUBIN 2.1*   ALKPHOSPHAT 504*   GLOBULIN 3.9*   INR 1.04     Recent Labs     03/25/24 1954   APTT 31.8   INR 1.04        IMAGING:   CT-CSPINE WITHOUT PLUS RECONS   Final Result         1.  No acute traumatic bony injury of the cervical spine is apparent.   2.  Atherosclerosis      CT-MAXILLOFACIAL W/O PLUS RECONS   Final Result         1. "  Medial left orbital wall fracture.   2.  Minimally displaced left orbital fracture   3.  Minimally displaced left nasal bone fracture   4.  Left intraorbital extraconal air   5.  Opacification left maxillary sinus, density favors intrasinus hemorrhage      DX-CHEST-LIMITED (1 VIEW)   Final Result         1.  No focal infiltrates.   2.  Atherosclerosis   3.  Perihilar interstitial prominence and bronchial wall cuffing, appearance suggests changes of underlying bronchial inflammation, consider bronchitis.      OUTSIDE IMAGES-DX CHEST   Final Result      OUTSIDE IMAGES-DX CERVICAL SPINE   Final Result      OUTSIDE IMAGES-CT HEAD   Final Result      OUTSIDE IMAGES-CT ABDOMEN /PELVIS   Final Result      US-TRAUMA VEIN SCREEN LOWER BILAT EXTREMITY    (Results Pending)   EC-ECHOCARDIOGRAM COMPLETE W/O CONT    (Results Pending)       IMPRESSION AND PLAN:  Trauma  Ground level fall. Syncope ?.  Trauma Yellow Transfer Activation from [facility] in [place], [place].  Raza Best MD. Trauma Surgery.    Facial fracture due to fall, closed, initial encounter ([facility])  Medial left orbital wall fracture.  Minimally displaced left orbital fracture  Minimally displaced left nasal bone fracture  Left intraorbital extraconal air  Opacification left maxillary sinus, density favors intrasinus hemorrhage  Consultation pending per ERP    Syncope  Hx of stent placement on 3/5/2024.   EKG completed.  ECHO pending.  Cardiology consultation forthcoming.    Splenic laceration, initial encounter  Grade I splenic laceration. Small perisplenic hematoma.  Serial hemograms and abdominal exams.    Contraindication to deep vein thrombosis (DVT) prophylaxis  VTE prophylaxis initially contraindicated secondary to elevated bleeding risk.  3/26 Trauma surveillance venous duplex ultrasonography ordered.    Antiplatelet or antithrombotic long-term use  On Plavix and aspirin.  TEG with platelet mapping pending.    Dyslipidemia  Chronic  condition treated with atorvastatin.  Resumed maintenance medication on admission.    Cirrhosis (HCC)  Noted on sending facility CT scan.      ____________________________________   Raza Best MD, FACS      DD: 3/25/2024   DT: 10:10 PM

## 2024-03-26 NOTE — DISCHARGE PLANNING
Trauma Yellow Transfer    Capri Quinonez 1950    Pt transferred from [facility] after a ground level fall. Pt family is aware she has been transferred to [facility].     Sister: Luz Maria Chen [phone] or [phone]    Pt is alert and oriented , able to make needs known.   SW will remain available for any continued needs.

## 2024-03-26 NOTE — THERAPY
Physical Therapy   Initial Evaluation     Patient Name: Capri Quinonez  Age:  73 y.o., Sex:  female  Medical Record #: [id number]  Today's Date: 3/26/2024     Precautions  Precautions: Fall Risk;    Assessment    73 y.o. female presented to the hospital s/p fall due to syncope.  She was found to have L orbital and nasal fractures, neck strain, chest wall contusion, and a splenic laceration.  PMHx includes CAD with cardiac stent, DM, cirrhosis, and gout. She presents with good strength, decreased vision in the L eye due to swelling without impacting balance, and independent mobility without an AD.  She has no skilled PT needs. Of note, pt's BP when PT entered the room noted to be 124/53, post ambulation it was 96/53 without complaints of dizziness or lightheadedness.  She may benefit from formal orthostatic assessment.    Plan    Physical Therapy Initial Treatment Plan   Duration: Evaluation only    DC Equipment Recommendations: None  Discharge Recommendations: Anticipate that the patient will have no further physical therapy needs after discharge from the hospital            Objective       03/26/24 0841   Precautions   Precautions Fall Risk;Cervical Collar     Vitals   Pulse 75   Blood Pressure  96/53  (/p ambulation)   Room Air Oximetry 91   Pain 0 - 10 Group   Therapist Pain Assessment Post Activity Pain Same as Prior to Activity   Prior Living Situation   Housing / Facility 1 Story House   Steps Into Home 1   Rail None   Bathroom Set up Walk In Shower;Bathtub / Shower Combination;Grab Bars   Equipment Owned Front-Wheel Walker;4-Wheel Walker   Lives with - Patient's Self Care Capacity Sibling  (81 y.o. sister)   Prior Level of Functional Mobility   Bed Mobility Independent   Transfer Status Independent   Ambulation Independent   Ambulation Distance community   Assistive Devices Used None   Stairs Independent   Comments Pt reports she and her sister take care of each other. Pt does most of the driving and cooking.   They have no other family in the area.   History of Falls   History of Falls No  (Pt reports current fall only x1 year)   Cognition    Cognition / Consciousness WDL   Level of Consciousness Alert   Passive ROM Lower Body   Passive ROM Lower Body WDL   Active ROM Lower Body    Active ROM Lower Body  WDL   Strength Lower Body   Lower Body Strength  WDL   Sensation Lower Body   Lower Extremity Sensation   WDL   Lower Body Muscle Tone   Lower Body Muscle Tone  WDL   Coordination Lower Body    Coordination Lower Body  WDL   Vision   Vision Comments Pt reports limited vision out of her L eye- significant edema and bruising from the fall.   Balance Assessment   Sitting Balance (Static) Fair +   Sitting Balance (Dynamic) Fair +   Standing Balance (Static) Fair   Standing Balance (Dynamic) Fair   Weight Shift Sitting Good   Weight Shift Standing Fair   Bed Mobility    Supine to Sit Independent   Scooting Independent   Rolling Independent   Comments bed flat, no rail   Gait Analysis   Gait Level Of Assist Supervised   Assistive Device None   Distance (Feet) 100   # of Times Distance was Traveled 2  (Standing rest break)   Deviation No deviation   Weight Bearing Status FWB   Comments No LOB with 4 way head turns   Functional Mobility   Sit to Stand Supervised   Bed, Chair, Wheelchair Transfer Supervised  (without AD)   Transfer Method Stand Step   ICU Target Mobility Level   ICU Mobility - Targeted Level Level 4   Activity Tolerance   Sitting Edge of Bed 5 min   Standing 2 min   Edema / Skin Assessment   Edema / Skin  WDL   Education Group   Education Provided Role of Physical Therapist   Role of Physical Therapist Patient Response Patient;Acceptance;Explanation;Verbal Demonstration;Action Demonstration   Physical Therapy Initial Treatment Plan    Duration Evaluation only   Anticipated Discharge Equipment and Recommendations   DC Equipment Recommendations None   Discharge Recommendations Anticipate that the patient will have  no further physical therapy needs after discharge from the hospital   Interdisciplinary Plan of Care Collaboration   IDT Collaboration with  Nursing;Occupational Therapist   Patient Position at End of Therapy Seated;Call Light within Reach;Tray Table within Reach   Collaboration Comments RN and OT updated on pt performance   Session Information   Date / Session Number  3/26 - eval only

## 2024-03-26 NOTE — DISCHARGE SUMMARY
Trauma Discharge Summary    DATE OF ADMISSION: 3/25/2024    DATE OF DISCHARGE: 3/28/2024    LENGTH OF STAY: 3 days    ATTENDING PHYSICIAN: Raza Best M.D.    CONSULTING PHYSICIAN: None    DISCHARGE DIAGNOSIS:  Principal Problem:    Trauma  Active Problems:    Syncope    Splenic laceration, initial encounter    Facial fracture due to fall, closed, initial encounter ([facility])    Contraindication to deep vein thrombosis (DVT) prophylaxis    Antiplatelet or antithrombotic long-term use    Dyslipidemia    Cirrhosis (HCC)    Hypertension    Gout    Type 2 diabetes mellitus, with long-term current use of insulin (HCC)    Diabetic neuropathy associated with type 2 diabetes mellitus (HCC)    Hypokalemia  Resolved Problems:    * No resolved hospital problems. *      PROCEDURES: None    HISTORY OF PRESENT ILLNESS: The patient is a 73 y.o. female who was reportedly injured in after sustaining a syncopal fall. She was initially evaluated at [facility] and was found to have a grade I spleen laceration & facial fractures. She was transferred to [facility] in [place], [place] for further trauma workup and evaluation.    HOSPITAL COURSE: The patient was triaged as a partial transfer activation. The patient was transported to trauma intensive care unit where she was monitored with serial hemograms & abdominal exams. A syncope workup was completed & unremarkable. The case was discussed with cardiology who recommended discharging the patient home with a cardiac event monitor. Facial surgery was consulted and recommended non operative management of her facial fractures. The patient was resumed on her dual antiplatelet therapy. She had no evidence of bleeding and her abdominal exam was benign. The patient was evaluated by physical therapy and occupational therapy who recommended the patient was safe to discharge home.     On day of discharge, the patient was tolerating a regular diet & room  air. She had adequate pain control. She had no evidence of bleeding and her hemoglobin was stable on her dual antiplatelet therapy. Her abdominal exam was benign. She was ambulating safely independently. She was discharged home with outpatient follow up as discussed below. A Zio patch for cardiac monitoring was placed at discharge.     HOSPITAL PROBLEM LIST:  * Trauma- (present on admission)  Assessment & Plan  Syncopal fall.  Trauma Yellow Transfer Activation from [facility] in [place], [place].  Raza Best MD. Trauma Surgery.    Splenic laceration, initial encounter- (present on admission)  Assessment & Plan  Grade I splenic laceration. Small perisplenic hematoma.  Serial hemograms and abdominal exams.    Syncope- (present on admission)  Assessment & Plan  Hx of stent placement on 3/5/2024.   EKG completed.  ECHO with LVEF 68%  Continuous cardiac monitoring.  Cardiology consultation: continue DAPT & discharge with cardiac event monitor.    Hypokalemia  Assessment & Plan  Replete & monitor.    Diabetic neuropathy associated with type 2 diabetes mellitus (HCC)- (present on admission)  Assessment & Plan  Chronic condition treated with gabapentin.  3/26 Resumed maintenance medication.    Type 2 diabetes mellitus, with long-term current use of insulin (HCC)- (present on admission)  Assessment & Plan  Chronic condition treated with LEVEMIR & OZEMPIC.  Holding maintenance medication during acute traumatic illness.  Insulin sliding scale.    Gout- (present on admission)  Assessment & Plan  Chronic condition treated with allopurinol.  Resumed maintenance medication on admission.    Hypertension- (present on admission)  Assessment & Plan  Chronic condition treated with Coreg.  Resumed maintenance medication on admission.    Cirrhosis (HCC)- (present on admission)  Assessment & Plan  Noted on sending facility CT scan.    Dyslipidemia- (present on admission)  Assessment & Plan  Chronic condition treated  with atorvastatin.  Resumed maintenance medication on admission.    Antiplatelet or antithrombotic long-term use- (present on admission)  Assessment & Plan  On Plavix and aspirin.  Admit TEG with platelet mapping completed, transfusion not indicated.  3/26 DAPT resumed.    Contraindication to deep vein thrombosis (DVT) prophylaxis- (present on admission)  Assessment & Plan  VTE prophylaxis initially contraindicated secondary to elevated bleeding risk.  3/26 Trauma surveillance venous duplex ultrasonography ordered.    Facial fracture due to fall, closed, initial encounter ([facility])- (present on admission)  Assessment & Plan  Medial left orbital wall fracture.  Minimally displaced left orbital fracture  Minimally displaced left nasal bone fracture  Non-operative management.  Alonso Elizabeth MD. Plastic Surgeon. [facility].      DISPOSITION: Discharged home on 3/27/24. The patient was counseled and questions were answered. Specifically, signs and symptoms of infection, respiratory decompensation, and persistent or worsening pain were discussed and the patient agrees to seek medical attention if any of these develop.    DISCHARGE MEDICATIONS:  The patients controlled substance history was reviewed and a controlled substance use informed consent (if applicable) was provided by [facility] and the patient has been prescribed.     Medication List        START taking these medications        Instructions   acetaminophen 325 MG Tabs  Commonly known as: Tylenol   Take 2 Tablets by mouth every four hours as needed for Mild Pain or Moderate Pain.  Dose: 650 mg            CONTINUE taking these medications        Instructions   allopurinol 100 MG Tabs  Commonly known as: Zyloprim   GEN FOR ZYLOPRIM. TAKE 1 TABLET(100 MG) BY MOUTH DAILY     aspirin 81 MG EC tablet   Take 81 mg by mouth every day.  Dose: 81 mg     atorvastatin 80 MG tablet  Commonly known as: Lipitor   Take 80 mg by mouth every  day.  Dose: 80 mg     carvedilol 12.5 MG Tabs  Commonly known as: Coreg   Take 12.5 mg by mouth 2 times a day.  Dose: 12.5 mg     clopidogrel 75 MG Tabs  Commonly known as: Plavix   Take 75 mg by mouth every day.  Dose: 75 mg     colchicine 0.6 MG Tabs  Commonly known as: Colcrys   Take 0.6 mg by mouth as needed (GOUT PAIN).  Dose: 0.6 mg     famotidine 20 MG Tabs  Commonly known as: Pepcid   Take 20 mg by mouth as needed (gerd). Indications: Heartburn  Dose: 20 mg     gabapentin 300 MG Caps  Commonly known as: Neurontin   Take 300 mg by mouth.  Dose: 300 mg     Levemir FlexPen 100 UNIT/ML injection PEN  Generic drug: insulin detemir   Inject 30 Units under the skin.  Dose: 30 Units     LORazepam 0.5 MG Tabs  Commonly known as: Ativan   Take 0.5 mg by mouth as needed for Anxiety. Indications: Feeling Anxious  Dose: 0.5 mg     OneTouch Verio strip  Generic drug: glucose blood   TEST BLOOD GLUCOSE TWICE DAILY AS DIRECTED     Ozempic (1 MG/DOSE) 2 MG/1.5ML Sopn  Generic drug: Semaglutide (1 MG/DOSE)   Inject 1 mg under the skin every 7 days. PT STATES ON SATURDAYS  Dose: 1 mg            ACTIVITY: as tolerated    DIET:  Orders Placed This Encounter   Procedures    Diet Order Diet: Regular     Standing Status:   Standing     Number of Occurrences:   1     Order Specific Question:   Diet:     Answer:   Regular [1]       FOLLOW UP:  [facility]  [address] [place] [id number] [phone]  Follow up  As needed for Zio patch monitoring    Alonso Elizabeth M.D.  [address]  [facility] [address]  [phone]    Follow up in 2 week(s)  Call to make a follow up appointment with facial surgeon Dr. Alonso Elizabeth    [facility] Smmg/cardiology  [address]  [facility] [address]  [phone]  Schedule an appointment as soon as possible for a visit  with established cardiologist    [facility]  [id number]  [facility]  [address]  [phone]  Follow up  As needed for trauma follow up clinic, repeat CBC ordered as needed      TIME SPENT ON DISCHARGE: 32 minutes    ____________________________________________  Karyna Deluna P.A.-C.    DD: 3/28/2024 8:31 AM

## 2024-03-26 NOTE — ED NOTES
"Trauma yellow transfer from [facility]. GLF at home 1400, dizzy then fainted onto tile floor. Sister found patient down in hallway and \"out of it\" called EMS.  Confirmed spleen lac, oribtal fx. On plavix for stents placed 3 months ago. Vitals stable in route.     Given 8mg morphine, 4mg zofran and initial facility.   "

## 2024-03-27 LAB
ALBUMIN SERPL BCP-MCNC: 3.2 G/DL (ref 3.2–4.9)
ALBUMIN/GLOB SERPL: 1.1 G/DL
ALP SERPL-CCNC: 315 U/L (ref 30–99)
ALT SERPL-CCNC: 82 U/L (ref 2–50)
ANION GAP SERPL CALC-SCNC: 10 MMOL/L (ref 7–16)
AST SERPL-CCNC: 138 U/L (ref 12–45)
BASOPHILS # BLD AUTO: 0.5 % (ref 0–1.8)
BASOPHILS # BLD: 0.03 K/UL (ref 0–0.12)
BILIRUB SERPL-MCNC: 1.6 MG/DL (ref 0.1–1.5)
BUN SERPL-MCNC: 18 MG/DL (ref 8–22)
CALCIUM ALBUM COR SERPL-MCNC: 9.5 MG/DL (ref 8.5–10.5)
CALCIUM SERPL-MCNC: 8.9 MG/DL (ref 8.5–10.5)
CHLORIDE SERPL-SCNC: 103 MMOL/L (ref 96–112)
CO2 SERPL-SCNC: 23 MMOL/L (ref 20–33)
CREAT SERPL-MCNC: 0.69 MG/DL (ref 0.5–1.4)
EOSINOPHIL # BLD AUTO: 0.3 K/UL (ref 0–0.51)
EOSINOPHIL NFR BLD: 5.2 % (ref 0–6.9)
ERYTHROCYTE [DISTWIDTH] IN BLOOD BY AUTOMATED COUNT: 51 FL (ref 35.9–50)
GFR SERPLBLD CREATININE-BSD FMLA CKD-EPI: 92 ML/MIN/1.73 M 2
GLOBULIN SER CALC-MCNC: 2.9 G/DL (ref 1.9–3.5)
GLUCOSE BLD STRIP.AUTO-MCNC: 116 MG/DL (ref 65–99)
GLUCOSE BLD STRIP.AUTO-MCNC: 163 MG/DL (ref 65–99)
GLUCOSE BLD STRIP.AUTO-MCNC: 179 MG/DL (ref 65–99)
GLUCOSE SERPL-MCNC: 117 MG/DL (ref 65–99)
HCT VFR BLD AUTO: 33.2 % (ref 37–47)
HCT VFR BLD AUTO: 33.4 % (ref 37–47)
HGB BLD-MCNC: 11.3 G/DL (ref 12–16)
HGB BLD-MCNC: 11.4 G/DL (ref 12–16)
IMM GRANULOCYTES # BLD AUTO: 0.01 K/UL (ref 0–0.11)
IMM GRANULOCYTES NFR BLD AUTO: 0.2 % (ref 0–0.9)
LYMPHOCYTES # BLD AUTO: 1.3 K/UL (ref 1–4.8)
LYMPHOCYTES NFR BLD: 22.6 % (ref 22–41)
MAGNESIUM SERPL-MCNC: 2.1 MG/DL (ref 1.5–2.5)
MCH RBC QN AUTO: 30.1 PG (ref 27–33)
MCHC RBC AUTO-ENTMCNC: 34.1 G/DL (ref 32.2–35.5)
MCV RBC AUTO: 88.1 FL (ref 81.4–97.8)
MONOCYTES # BLD AUTO: 0.4 K/UL (ref 0–0.85)
MONOCYTES NFR BLD AUTO: 7 % (ref 0–13.4)
NEUTROPHILS # BLD AUTO: 3.71 K/UL (ref 1.82–7.42)
NEUTROPHILS NFR BLD: 64.5 % (ref 44–72)
NRBC # BLD AUTO: 0 K/UL
NRBC BLD-RTO: 0 /100 WBC (ref 0–0.2)
PHOSPHATE SERPL-MCNC: 2.1 MG/DL (ref 2.5–4.5)
PLATELET # BLD AUTO: 140 K/UL (ref 164–446)
PMV BLD AUTO: 12.4 FL (ref 9–12.9)
POTASSIUM SERPL-SCNC: 3.7 MMOL/L (ref 3.6–5.5)
PROT SERPL-MCNC: 6.1 G/DL (ref 6–8.2)
RBC # BLD AUTO: 3.79 M/UL (ref 4.2–5.4)
SODIUM SERPL-SCNC: 136 MMOL/L (ref 135–145)
WBC # BLD AUTO: 5.8 K/UL (ref 4.8–10.8)

## 2024-03-27 PROCEDURE — 82962 GLUCOSE BLOOD TEST: CPT | Mod: 91

## 2024-03-27 PROCEDURE — 700102 HCHG RX REV CODE 250 W/ 637 OVERRIDE(OP): Performed by: PHYSICIAN ASSISTANT

## 2024-03-27 PROCEDURE — A9270 NON-COVERED ITEM OR SERVICE: HCPCS | Performed by: NURSE PRACTITIONER

## 2024-03-27 PROCEDURE — 700102 HCHG RX REV CODE 250 W/ 637 OVERRIDE(OP): Performed by: NURSE PRACTITIONER

## 2024-03-27 PROCEDURE — 93005 ELECTROCARDIOGRAM TRACING: CPT | Performed by: PHYSICIAN ASSISTANT

## 2024-03-27 PROCEDURE — 80053 COMPREHEN METABOLIC PANEL: CPT

## 2024-03-27 PROCEDURE — A9270 NON-COVERED ITEM OR SERVICE: HCPCS

## 2024-03-27 PROCEDURE — A9270 NON-COVERED ITEM OR SERVICE: HCPCS | Performed by: PHYSICIAN ASSISTANT

## 2024-03-27 PROCEDURE — 770020 HCHG ROOM/CARE - TELE (206)

## 2024-03-27 PROCEDURE — 700105 HCHG RX REV CODE 258: Performed by: PHYSICIAN ASSISTANT

## 2024-03-27 PROCEDURE — 83735 ASSAY OF MAGNESIUM: CPT

## 2024-03-27 PROCEDURE — 99232 SBSQ HOSP IP/OBS MODERATE 35: CPT | Performed by: PHYSICIAN ASSISTANT

## 2024-03-27 PROCEDURE — 85018 HEMOGLOBIN: CPT

## 2024-03-27 PROCEDURE — 36415 COLL VENOUS BLD VENIPUNCTURE: CPT

## 2024-03-27 PROCEDURE — 85025 COMPLETE CBC W/AUTO DIFF WBC: CPT

## 2024-03-27 PROCEDURE — 84100 ASSAY OF PHOSPHORUS: CPT

## 2024-03-27 PROCEDURE — 85014 HEMATOCRIT: CPT

## 2024-03-27 PROCEDURE — 700102 HCHG RX REV CODE 250 W/ 637 OVERRIDE(OP)

## 2024-03-27 RX ORDER — SODIUM CHLORIDE, SODIUM LACTATE, POTASSIUM CHLORIDE, AND CALCIUM CHLORIDE .6; .31; .03; .02 G/100ML; G/100ML; G/100ML; G/100ML
500 INJECTION, SOLUTION INTRAVENOUS ONCE
Status: COMPLETED | OUTPATIENT
Start: 2024-03-27 | End: 2024-03-27

## 2024-03-27 RX ADMIN — OXYCODONE 5 MG: 5 TABLET ORAL at 03:48

## 2024-03-27 RX ADMIN — ALLOPURINOL 100 MG: 100 TABLET ORAL at 05:11

## 2024-03-27 RX ADMIN — ASPIRIN 81 MG: 81 TABLET, COATED ORAL at 05:09

## 2024-03-27 RX ADMIN — CARVEDILOL 12.5 MG: 12.5 TABLET, FILM COATED ORAL at 05:11

## 2024-03-27 RX ADMIN — GABAPENTIN 300 MG: 300 CAPSULE ORAL at 18:25

## 2024-03-27 RX ADMIN — GABAPENTIN 300 MG: 300 CAPSULE ORAL at 05:11

## 2024-03-27 RX ADMIN — POTASSIUM CHLORIDE 20 MEQ: 1500 TABLET, EXTENDED RELEASE ORAL at 05:09

## 2024-03-27 RX ADMIN — DOCUSATE SODIUM 100 MG: 100 CAPSULE, LIQUID FILLED ORAL at 05:11

## 2024-03-27 RX ADMIN — DIBASIC SODIUM PHOSPHATE, MONOBASIC POTASSIUM PHOSPHATE AND MONOBASIC SODIUM PHOSPHATE 250 MG: 852; 155; 130 TABLET ORAL at 08:30

## 2024-03-27 RX ADMIN — CLOPIDOGREL BISULFATE 75 MG: 75 TABLET ORAL at 05:11

## 2024-03-27 RX ADMIN — DOCUSATE SODIUM 100 MG: 100 CAPSULE, LIQUID FILLED ORAL at 18:26

## 2024-03-27 RX ADMIN — INSULIN HUMAN 1 UNITS: 100 INJECTION, SOLUTION PARENTERAL at 18:26

## 2024-03-27 RX ADMIN — ACETAMINOPHEN 650 MG: 325 TABLET, FILM COATED ORAL at 18:26

## 2024-03-27 RX ADMIN — INSULIN HUMAN 1 UNITS: 100 INJECTION, SOLUTION PARENTERAL at 13:06

## 2024-03-27 RX ADMIN — ACETAMINOPHEN 650 MG: 325 TABLET, FILM COATED ORAL at 05:09

## 2024-03-27 RX ADMIN — ACETAMINOPHEN 650 MG: 325 TABLET, FILM COATED ORAL at 13:06

## 2024-03-27 RX ADMIN — ATORVASTATIN CALCIUM 80 MG: 80 TABLET, FILM COATED ORAL at 05:09

## 2024-03-27 RX ADMIN — SODIUM CHLORIDE, POTASSIUM CHLORIDE, SODIUM LACTATE AND CALCIUM CHLORIDE 500 ML: 600; 310; 30; 20 INJECTION, SOLUTION INTRAVENOUS at 08:26

## 2024-03-27 ASSESSMENT — ENCOUNTER SYMPTOMS
FEVER: 0
DIZZINESS: 1
CHILLS: 0
ABDOMINAL PAIN: 0
PALPITATIONS: 0
NAUSEA: 0
MYALGIAS: 1
VOMITING: 0
FOCAL WEAKNESS: 0
HEADACHES: 0
SHORTNESS OF BREATH: 0
SENSORY CHANGE: 0
TINGLING: 0

## 2024-03-27 ASSESSMENT — FIBROSIS 4 INDEX: FIB4 SCORE: 8.64

## 2024-03-27 ASSESSMENT — PAIN DESCRIPTION - PAIN TYPE
TYPE: ACUTE PAIN
TYPE: ACUTE PAIN

## 2024-03-27 NOTE — CARE PLAN
The patient is Stable - Low risk of patient condition declining or worsening    Shift Goals  Clinical Goals: pain control,monitor hgb  Patient Goals: go home  Family Goals: [place]    Progress made toward(s) clinical / shift goals:      Problem: Knowledge Deficit - Standard  Goal: Patient and family/care givers will demonstrate understanding of plan of care, disease process/condition, diagnostic tests and medications  Outcome: Progressing  Note: Discussed POC with patient.     Problem: Pain - Standard  Goal: Alleviation of pain or a reduction in pain to the patient’s comfort goal  Outcome: Progressing  Note: Patient is using pharm and non pharm pain relieving measures appropriately.       Patient is not progressing towards the following goals:

## 2024-03-27 NOTE — PROGRESS NOTES
Bedside report received from off going RN/tech: Cristina, assumed care of patient.     Fall Risk Score: MODERATE RISK  Fall risk interventions in place: Place yellow fall risk ID band on patient, Provide patient/family education based on risk assessment, Educate patient/family to call staff for assistance when getting out of bed, and Utilize bed/chair fall alarm  Bed type: Regular (Kem Score less than 17 interventions in place)  Patient on cardiac monitor: Yes  IVF/IV medications: Not Applicable   Oxygen: Room Air  Bedside sitter: Not Applicable   Isolation: Not applicable

## 2024-03-27 NOTE — PROGRESS NOTES
Monitor Summary:   Rhythm: NSR  Rate: 76 - 84  Measurement: .12/.08/.38  Ectopy: NA    12 hour chart check

## 2024-03-27 NOTE — CONSULTS
DATE OF SERVICE:  03/26/2024     CHIEF COMPLAINT:  Multiple fractures of the facial skeleton.     BRIEF HISTORY:  The patient was admitted yesterday.  She had a ground level   fall.  During the course of her trauma evaluation and workup, was identified   as having multiple fractures of her facial skeleton.  She was also identified   as having a grade 1 splenic laceration.  The patient with regards to her face   tells me that she has a little bit of numbness over the anterior part of her   face.  She says she can see normally out of her left eye.  She says she has no   double vision.  She says she breathes normally out of her nose.  She says   that her face other than being a little swollen around the left eye looks as   it did before and that her nose does not appear to be out of place.  She says   her teeth come together normally.     PAST MEDICAL HISTORY:  The patient has coronary artery disease and had recent   cardiac stents placed.  She has hypercholesterolemia.  She has diabetes, which   is well controlled.  The patient has hypertension.     PAST SURGICAL HISTORY:  The patient has had no previous surgical procedure.     MEDICATIONS:  At home, the patient takes allopurinol, aspirin, Lipitor, Coreg,   Plavix, colchicine, Pepcid, Neurontin, insulin, Ativan, semaglutide.     ALLERGIES TO MEDICATIONS: TO ATORVASTATIN AND TO HYDROCODONE.     HABITS:  The patient does not drink alcohol.  She does smoke cigarettes.  She   denies illegal drug use.     FAMILY HISTORY:  Noncontributory for the presenting illness.     REVIEW OF SYSTEMS:  The patient is asked a greater than 12-point review of   systems.  This includes head, eyes, ears, mouth, throat, lungs, heart,   stomach, bowels, kidneys, bladder, neurologic, psychiatric, constitutional and   others and all were answered negatively or noncontributory or were mentioned   in the patient's history.     PHYSICAL EXAMINATION:   GENERAL:  The patient is well nourished and  well-developed.  She is resting   comfortably in her hospital bed in no acute distress.  VITAL SIGNS:   Temperature is 36.8, heart rate 83, respiratory rate 18, blood   pressure 129/63.  She is saturating 92% on room air.  HEENT:  The patient's left periorbital area is modestly swollen.  There is no   enophthalmus.  There is no exophthalmus.  Both pupils are equal, round,   reactive to light.  Vision is grossly normal.  The patient can read the clock   on the other side of the room.  The patient's extraocular motions are intact   without evidence of mechanical restriction.  The nose is midline.  The nose is   nontender.  Intranasal examination reveals no septal hematoma.  There is no   anterior septal deviation.  Intraoral examination reveals normal occlusion.  NECK:  The neck is nontender in the bony midline.  LUNGS:  The lungs are clear to auscultation  HEART:  The heart is regular rate.  There is no murmur.  ABDOMEN:  The abdomen is soft and nontender.  There is no guarding.  There is   no rebound.  BACK:  There is no bony midline tenderness.  There is no CVA tenderness.  GENITOURINARY:  Deferred.  EXTREMITIES:  There is no clubbing, cyanosis or edema.  NEUROLOGIC:  The patient is grossly intact.  PSYCHIATRIC:  The patient is of normal mood and affect.     DIAGNOSTIC DATA:  Review of the radiographs:  The patient has a minimally to   nondisplaced nasal bone fracture.  The patient has a small nondisplaced   orbital floor fracture without evidence of complication.     ASSESSMENT AND PLAN:  Nondisplaced fractures of the facial skeleton.    Currently, there are no cosmetic or functional issues related to these facial   fractures and the expectation is that these will heal with nonsurgical   management.  At this point, I counseled the patient to keep her head elevated   and use ice on her face.  I advised the patient to not blow her nose as it   might force air up into the orbit.  I have told the patient we would    reevaluate her in the office in approximately 2 weeks.  The patient had all of   her questions answered.  Nothing further at this time other than outpatient   followup in 2 weeks.  This will be scheduled in the computer today.        ______________________________  MD JESUS ALBERTO SANDHU/RADHA    DD:  03/26/2024 17:53  DT:  03/26/2024 18:35    Job#:  [id number]

## 2024-03-27 NOTE — PROGRESS NOTES
Bedside report received from off going RN/tech: xiao Ventura care of patient.     Fall Risk Score: MODERATE RISK  Fall risk interventions in place: Place yellow fall risk ID band on patient, Provide patient/family education based on risk assessment, Educate patient/family to call staff for assistance when getting out of bed, Place fall precaution signage outside patient door, Place patient in room close to nursing station, Utilize bed/chair fall alarm, and Bed alarm connected correctly  Bed type: Regular (Kem Score less than 17 interventions in place)  Patient on cardiac monitor: Yes  IVF/IV medications: Not Applicable   Oxygen: Room Air  Bedside sitter: Not Applicable   Isolation: Not applicable

## 2024-03-27 NOTE — PROGRESS NOTES
Trauma / Surgical Daily Progress Note    Date of Service  3/27/2024    Chief Complaint  73 y.o. female admitted 3/25/2024 with left orbital fractures, nasal fracture, and grade I spleen laceration after fall from syncopal episode.     Interval Events  Patient feeling dizzy with standing this morning.   Positive orthostatic BP - 119/56 laying, 110/60 sitting,  75/39 standing.   ECG with no changes from previous.     - 500cc LR bolus.   - Will re-evaluate for discharge this afternoon.   - Reschedule outpatient cardiology appointment, which was today at 0930 AM.     Review of Systems  Review of Systems   Constitutional:  Positive for malaise/fatigue. Negative for chills and fever.   Respiratory:  Negative for shortness of breath.    Cardiovascular:  Negative for chest pain and palpitations.   Gastrointestinal:  Negative for abdominal pain, nausea and vomiting.   Musculoskeletal:  Positive for myalgias.   Neurological:  Positive for dizziness. Negative for tingling, sensory change, focal weakness and headaches.        Vital Signs  Temp:  [36.5 °C (97.7 °F)-36.8 °C (98.2 °F)] 36.5 °C (97.7 °F)  Pulse:  [75-83] 78  Resp:  [16-20] 16  BP: ()/(39-80) 119/62  SpO2:  [90 %-96 %] 96 %    Physical Exam  Physical Exam  Constitutional:       General: She is not in acute distress.     Appearance: She is not ill-appearing.   HENT:      Head: Normocephalic and atraumatic.      Right Ear: External ear normal.      Left Ear: External ear normal.      Nose:      Comments: Nasal bridge tenderness.     Mouth/Throat:      Mouth: Mucous membranes are moist.      Pharynx: Oropharynx is clear.   Eyes:      Extraocular Movements: Extraocular movements intact.      Pupils: Pupils are equal, round, and reactive to light.      Comments: Left periorbital edema, ecchymosis, & tenderness.   Cardiovascular:      Rate and Rhythm: Normal rate and regular rhythm.      Heart sounds: Murmur heard.   Pulmonary:      Effort: Pulmonary effort is  normal. No respiratory distress.      Breath sounds: Normal breath sounds.   Chest:      Chest wall: No tenderness.   Abdominal:      General: Bowel sounds are normal. There is no distension.      Palpations: Abdomen is soft.      Tenderness: There is abdominal tenderness (mild LUQ). There is no guarding.   Musculoskeletal:         General: No swelling, tenderness or deformity.      Cervical back: Normal range of motion and neck supple. No tenderness.      Right lower leg: No edema.      Left lower leg: No edema.   Skin:     General: Skin is warm and dry.      Capillary Refill: Capillary refill takes less than 2 seconds.   Neurological:      General: No focal deficit present.      Mental Status: She is alert and oriented to person, place, and time. Mental status is at baseline.      GCS: GCS eye subscore is 4. GCS verbal subscore is 5. GCS motor subscore is 6.      Sensory: No sensory deficit.      Motor: No weakness.      Comments: 5/5 strength bilateral upper extremities with strong grasp bilaterally.   5/5 strength bilateral lower extremities with strong plantar & dorsiflexion bilaterally.   Psychiatric:         Behavior: Behavior normal. Behavior is cooperative.         Laboratory  Recent Results (from the past 24 hour(s))   EC-ECHOCARDIOGRAM COMPLETE W/O CONT    Collection Time: 03/26/24  9:46 AM   Result Value Ref Range    Eject.Frac. MOD BP 68.12     Eject.Frac. MOD 4C 74.97     Eject.Frac. MOD 2C 59.08     Left Ventrical Ejection Fraction 68    POCT glucose device results    Collection Time: 03/26/24 12:16 PM   Result Value Ref Range    POC Glucose, Blood 121 (H) 65 - 99 mg/dL   Hemoglobin - Q6 hours x4    Collection Time: 03/26/24  5:23 PM   Result Value Ref Range    Hemoglobin 11.6 (L) 12.0 - 16.0 g/dL   POCT glucose device results    Collection Time: 03/26/24  5:25 PM   Result Value Ref Range    POC Glucose, Blood 159 (H) 65 - 99 mg/dL   POCT glucose device results    Collection Time: 03/26/24 11:26 PM    Result Value Ref Range    POC Glucose, Blood 119 (H) 65 - 99 mg/dL   POCT glucose device results    Collection Time: 03/27/24  5:07 AM   Result Value Ref Range    POC Glucose, Blood 116 (H) 65 - 99 mg/dL   PHOSPHORUS    Collection Time: 03/27/24  5:08 AM   Result Value Ref Range    Phosphorus 2.1 (L) 2.5 - 4.5 mg/dL   MAGNESIUM    Collection Time: 03/27/24  5:08 AM   Result Value Ref Range    Magnesium 2.1 1.5 - 2.5 mg/dL   Comp Metabolic Panel (CMP): Tomorrow AM    Collection Time: 03/27/24  5:08 AM   Result Value Ref Range    Sodium 136 135 - 145 mmol/L    Potassium 3.7 3.6 - 5.5 mmol/L    Chloride 103 96 - 112 mmol/L    Co2 23 20 - 33 mmol/L    Anion Gap 10.0 7.0 - 16.0    Glucose 117 (H) 65 - 99 mg/dL    Bun 18 8 - 22 mg/dL    Creatinine 0.69 0.50 - 1.40 mg/dL    Calcium 8.9 8.5 - 10.5 mg/dL    Correct Calcium 9.5 8.5 - 10.5 mg/dL    AST(SGOT) 138 (H) 12 - 45 U/L    ALT(SGPT) 82 (H) 2 - 50 U/L    Alkaline Phosphatase 315 (H) 30 - 99 U/L    Total Bilirubin 1.6 (H) 0.1 - 1.5 mg/dL    Albumin 3.2 3.2 - 4.9 g/dL    Total Protein 6.1 6.0 - 8.2 g/dL    Globulin 2.9 1.9 - 3.5 g/dL    A-G Ratio 1.1 g/dL   CBC with Differential: Tomorrow AM    Collection Time: 03/27/24  5:08 AM   Result Value Ref Range    WBC 5.8 4.8 - 10.8 K/uL    RBC 3.79 (L) 4.20 - 5.40 M/uL    Hemoglobin 11.4 (L) 12.0 - 16.0 g/dL    Hematocrit 33.4 (L) 37.0 - 47.0 %    MCV 88.1 81.4 - 97.8 fL    MCH 30.1 27.0 - 33.0 pg    MCHC 34.1 32.2 - 35.5 g/dL    RDW 51.0 (H) 35.9 - 50.0 fL    Platelet Count 140 (L) 164 - 446 K/uL    MPV 12.4 9.0 - 12.9 fL    Neutrophils-Polys 64.50 44.00 - 72.00 %    Lymphocytes 22.60 22.00 - 41.00 %    Monocytes 7.00 0.00 - 13.40 %    Eosinophils 5.20 0.00 - 6.90 %    Basophils 0.50 0.00 - 1.80 %    Immature Granulocytes 0.20 0.00 - 0.90 %    Nucleated RBC 0.00 0.00 - 0.20 /100 WBC    Neutrophils (Absolute) 3.71 1.82 - 7.42 K/uL    Lymphs (Absolute) 1.30 1.00 - 4.80 K/uL    Monos (Absolute) 0.40 0.00 - 0.85 K/uL    Eos  (Absolute) 0.30 0.00 - 0.51 K/uL    Baso (Absolute) 0.03 0.00 - 0.12 K/uL    Immature Granulocytes (abs) 0.01 0.00 - 0.11 K/uL    NRBC (Absolute) 0.00 K/uL   ESTIMATED GFR    Collection Time: 24  5:08 AM   Result Value Ref Range    GFR (CKD-EPI) 92 >60 mL/min/1.73 m 2   EKG    Collection Time: 24  8:30 AM   Result Value Ref Range    Report       [facility]    Test Date:  2024  Pt Name:    RAHUL CAZARES              Department: [id number]  MRN:        [id number]                      Room:       T836  Gender:     Female                       Technician: JASIEL  :        1950                   Requested By:ARPIT CARVAJAL  Order #:    [id number]                    Reading MD:    Measurements  Intervals                                Axis  Rate:       68                           P:          49  AK:         127                          QRS:        38  QRSD:       89                           T:          98  QT:         412  QTc:        439    Interpretive Statements  Sinus rhythm  Nonspecific T abnormalities, lateral leads  Compared to ECG 2024 20:39:15  T-wave abnormality now present         Fluids    Intake/Output Summary (Last 24 hours) at 3/27/2024 0919  Last data filed at 3/26/2024 2028  Gross per 24 hour   Intake 219.8 ml   Output --   Net 219.8 ml       Core Measures & Quality Metrics  Labs reviewed, Medications reviewed, Radiology images reviewed and EKG reviewed  Franklin catheter: No Franklin      DVT Prophylaxis: Not indicated at this time, ambulatory  DVT prophylaxis - mechanical: SCDs  Ulcer prophylaxis: Not indicated        RAP Score Total: 5    CAGE Results: negative Blood Alcohol>0.08: no       Assessment/Plan  * Trauma- (present on admission)  Assessment & Plan  Syncopal fall.  Trauma Yellow Transfer Activation from [facility] in [place], [place].  Raza Best MD. Trauma Surgery.    Splenic laceration, initial encounter- (present on admission)  Assessment &  Plan  Grade I splenic laceration. Small perisplenic hematoma.  Serial hemograms and abdominal exams.    Syncope- (present on admission)  Assessment & Plan  Hx of stent placement on 3/5/2024.   EKG completed.  ECHO with LVEF 68%  Continuous cardiac monitoring.  Cardiology consultation: continue DAPT & discharge with cardiac event monitor.    Hypokalemia  Assessment & Plan  Replete & monitor.    Diabetic neuropathy associated with type 2 diabetes mellitus (HCC)- (present on admission)  Assessment & Plan  Chronic condition treated with gabapentin.  3/26 Resumed maintenance medication.    Type 2 diabetes mellitus, with long-term current use of insulin (HCC)- (present on admission)  Assessment & Plan  Chronic condition treated with LEVEMIR & OZEMPIC.  Holding maintenance medication during acute traumatic illness.  Insulin sliding scale.    Gout- (present on admission)  Assessment & Plan  Chronic condition treated with allopurinol.  Resumed maintenance medication on admission.    Hypertension- (present on admission)  Assessment & Plan  Chronic condition treated with Coreg.  Resumed maintenance medication on admission.    Cirrhosis (HCC)- (present on admission)  Assessment & Plan  Noted on sending facility CT scan.    Dyslipidemia- (present on admission)  Assessment & Plan  Chronic condition treated with atorvastatin.  Resumed maintenance medication on admission.    Antiplatelet or antithrombotic long-term use- (present on admission)  Assessment & Plan  On Plavix and aspirin.  Admit TEG with platelet mapping completed, transfusion not indicated.  3/26 DAPT resumed.    Contraindication to deep vein thrombosis (DVT) prophylaxis- (present on admission)  Assessment & Plan  VTE prophylaxis initially contraindicated secondary to elevated bleeding risk.  3/26 Trauma surveillance venous duplex ultrasonography ordered.    Facial fracture due to fall, closed, initial encounter ([facility])- (present on admission)  Assessment & Plan  Medial  left orbital wall fracture.  Minimally displaced left orbital fracture  Minimally displaced left nasal bone fracture  Non-operative management.  Alonso Elizabeth MD. Plastic Surgeon. [facility].      Discussed patient condition with RN, Patient, and trauma surgery. Raza Best MD.

## 2024-03-28 ENCOUNTER — NON-PROVIDER VISIT (OUTPATIENT)
Dept: CARDIOLOGY | Facility: MEDICAL CENTER | Age: 74
End: 2024-03-28
Attending: NURSE PRACTITIONER
Payer: MEDICARE

## 2024-03-28 VITALS
BODY MASS INDEX: 26.13 KG/M2 | HEIGHT: 62 IN | TEMPERATURE: 97.9 F | HEART RATE: 83 BPM | SYSTOLIC BLOOD PRESSURE: 117 MMHG | DIASTOLIC BLOOD PRESSURE: 64 MMHG | RESPIRATION RATE: 16 BRPM | OXYGEN SATURATION: 100 % | WEIGHT: 141.98 LBS

## 2024-03-28 LAB
ALBUMIN SERPL BCP-MCNC: 3.2 G/DL (ref 3.2–4.9)
ALBUMIN/GLOB SERPL: 1 G/DL
ALP SERPL-CCNC: 350 U/L (ref 30–99)
ALT SERPL-CCNC: 86 U/L (ref 2–50)
ANION GAP SERPL CALC-SCNC: 11 MMOL/L (ref 7–16)
AST SERPL-CCNC: 149 U/L (ref 12–45)
BASOPHILS # BLD AUTO: 0.5 % (ref 0–1.8)
BASOPHILS # BLD: 0.03 K/UL (ref 0–0.12)
BILIRUB SERPL-MCNC: 1.2 MG/DL (ref 0.1–1.5)
BUN SERPL-MCNC: 21 MG/DL (ref 8–22)
CALCIUM ALBUM COR SERPL-MCNC: 9.3 MG/DL (ref 8.5–10.5)
CALCIUM SERPL-MCNC: 8.7 MG/DL (ref 8.5–10.5)
CHLORIDE SERPL-SCNC: 106 MMOL/L (ref 96–112)
CO2 SERPL-SCNC: 21 MMOL/L (ref 20–33)
CREAT SERPL-MCNC: 0.65 MG/DL (ref 0.5–1.4)
EKG IMPRESSION: NORMAL
EOSINOPHIL # BLD AUTO: 0.27 K/UL (ref 0–0.51)
EOSINOPHIL NFR BLD: 4.8 % (ref 0–6.9)
ERYTHROCYTE [DISTWIDTH] IN BLOOD BY AUTOMATED COUNT: 53.3 FL (ref 35.9–50)
GFR SERPLBLD CREATININE-BSD FMLA CKD-EPI: 94 ML/MIN/1.73 M 2
GLOBULIN SER CALC-MCNC: 3.2 G/DL (ref 1.9–3.5)
GLUCOSE BLD STRIP.AUTO-MCNC: 150 MG/DL (ref 65–99)
GLUCOSE BLD STRIP.AUTO-MCNC: 180 MG/DL (ref 65–99)
GLUCOSE SERPL-MCNC: 178 MG/DL (ref 65–99)
HCT VFR BLD AUTO: 32 % (ref 37–47)
HGB BLD-MCNC: 10.5 G/DL (ref 12–16)
IMM GRANULOCYTES # BLD AUTO: 0.02 K/UL (ref 0–0.11)
IMM GRANULOCYTES NFR BLD AUTO: 0.4 % (ref 0–0.9)
IRON SATN MFR SERPL: 19 % (ref 15–55)
IRON SERPL-MCNC: 51 UG/DL (ref 40–170)
LYMPHOCYTES # BLD AUTO: 1.03 K/UL (ref 1–4.8)
LYMPHOCYTES NFR BLD: 18.2 % (ref 22–41)
MAGNESIUM SERPL-MCNC: 2 MG/DL (ref 1.5–2.5)
MCH RBC QN AUTO: 29.4 PG (ref 27–33)
MCHC RBC AUTO-ENTMCNC: 32.8 G/DL (ref 32.2–35.5)
MCV RBC AUTO: 89.6 FL (ref 81.4–97.8)
MONOCYTES # BLD AUTO: 0.51 K/UL (ref 0–0.85)
MONOCYTES NFR BLD AUTO: 9 % (ref 0–13.4)
NEUTROPHILS # BLD AUTO: 3.81 K/UL (ref 1.82–7.42)
NEUTROPHILS NFR BLD: 67.1 % (ref 44–72)
NRBC # BLD AUTO: 0 K/UL
NRBC BLD-RTO: 0 /100 WBC (ref 0–0.2)
PHOSPHATE SERPL-MCNC: 2.2 MG/DL (ref 2.5–4.5)
PLATELET # BLD AUTO: 142 K/UL (ref 164–446)
PMV BLD AUTO: 12.4 FL (ref 9–12.9)
POTASSIUM SERPL-SCNC: 3.9 MMOL/L (ref 3.6–5.5)
PROT SERPL-MCNC: 6.4 G/DL (ref 6–8.2)
RBC # BLD AUTO: 3.57 M/UL (ref 4.2–5.4)
SODIUM SERPL-SCNC: 138 MMOL/L (ref 135–145)
TIBC SERPL-MCNC: 268 UG/DL (ref 250–450)
UIBC SERPL-MCNC: 217 UG/DL (ref 110–370)
WBC # BLD AUTO: 5.7 K/UL (ref 4.8–10.8)

## 2024-03-28 PROCEDURE — 36415 COLL VENOUS BLD VENIPUNCTURE: CPT

## 2024-03-28 PROCEDURE — 84100 ASSAY OF PHOSPHORUS: CPT

## 2024-03-28 PROCEDURE — 80053 COMPREHEN METABOLIC PANEL: CPT

## 2024-03-28 PROCEDURE — 700102 HCHG RX REV CODE 250 W/ 637 OVERRIDE(OP): Mod: JZ | Performed by: NURSE PRACTITIONER

## 2024-03-28 PROCEDURE — 83550 IRON BINDING TEST: CPT

## 2024-03-28 PROCEDURE — 82962 GLUCOSE BLOOD TEST: CPT

## 2024-03-28 PROCEDURE — 85025 COMPLETE CBC W/AUTO DIFF WBC: CPT

## 2024-03-28 PROCEDURE — 700102 HCHG RX REV CODE 250 W/ 637 OVERRIDE(OP): Performed by: PHYSICIAN ASSISTANT

## 2024-03-28 PROCEDURE — 83735 ASSAY OF MAGNESIUM: CPT

## 2024-03-28 PROCEDURE — 83540 ASSAY OF IRON: CPT

## 2024-03-28 PROCEDURE — 93010 ELECTROCARDIOGRAM REPORT: CPT | Performed by: INTERNAL MEDICINE

## 2024-03-28 PROCEDURE — 99239 HOSP IP/OBS DSCHRG MGMT >30: CPT | Performed by: PHYSICIAN ASSISTANT

## 2024-03-28 PROCEDURE — A9270 NON-COVERED ITEM OR SERVICE: HCPCS | Performed by: PHYSICIAN ASSISTANT

## 2024-03-28 PROCEDURE — 700102 HCHG RX REV CODE 250 W/ 637 OVERRIDE(OP)

## 2024-03-28 PROCEDURE — A9270 NON-COVERED ITEM OR SERVICE: HCPCS | Mod: JZ | Performed by: NURSE PRACTITIONER

## 2024-03-28 PROCEDURE — A9270 NON-COVERED ITEM OR SERVICE: HCPCS

## 2024-03-28 RX ADMIN — DOCUSATE SODIUM 100 MG: 100 CAPSULE, LIQUID FILLED ORAL at 04:32

## 2024-03-28 RX ADMIN — GABAPENTIN 300 MG: 300 CAPSULE ORAL at 04:32

## 2024-03-28 RX ADMIN — ASPIRIN 81 MG: 81 TABLET, COATED ORAL at 04:32

## 2024-03-28 RX ADMIN — CLOPIDOGREL BISULFATE 75 MG: 75 TABLET ORAL at 04:32

## 2024-03-28 RX ADMIN — CARVEDILOL 12.5 MG: 12.5 TABLET, FILM COATED ORAL at 04:32

## 2024-03-28 RX ADMIN — POTASSIUM CHLORIDE 20 MEQ: 1500 TABLET, EXTENDED RELEASE ORAL at 04:32

## 2024-03-28 RX ADMIN — ACETAMINOPHEN 650 MG: 325 TABLET, FILM COATED ORAL at 00:32

## 2024-03-28 RX ADMIN — DIBASIC SODIUM PHOSPHATE, MONOBASIC POTASSIUM PHOSPHATE AND MONOBASIC SODIUM PHOSPHATE 250 MG: 852; 155; 130 TABLET ORAL at 08:14

## 2024-03-28 RX ADMIN — ATORVASTATIN CALCIUM 80 MG: 80 TABLET, FILM COATED ORAL at 04:32

## 2024-03-28 RX ADMIN — ALLOPURINOL 100 MG: 100 TABLET ORAL at 04:32

## 2024-03-28 RX ADMIN — ACETAMINOPHEN 650 MG: 325 TABLET, FILM COATED ORAL at 04:32

## 2024-03-28 RX ADMIN — INSULIN HUMAN 1 UNITS: 100 INJECTION, SOLUTION PARENTERAL at 00:35

## 2024-03-28 RX ADMIN — POLYETHYLENE GLYCOL 3350 1 PACKET: 17 POWDER, FOR SOLUTION ORAL at 04:32

## 2024-03-28 ASSESSMENT — FIBROSIS 4 INDEX: FIB4 SCORE: 8.03

## 2024-03-28 ASSESSMENT — PAIN DESCRIPTION - PAIN TYPE: TYPE: ACUTE PAIN

## 2024-03-28 NOTE — CARE PLAN
Problem: Knowledge Deficit - Standard  Goal: Patient and family/care givers will demonstrate understanding of plan of care, disease process/condition, diagnostic tests and medications  Outcome: Met     Problem: Skin Integrity  Goal: Skin integrity is maintained or improved  Outcome: Met     Problem: Fall Risk  Goal: Patient will remain free from falls  Outcome: Met   The patient is Stable - Low risk of patient condition declining or worsening    Shift Goals  Clinical Goals: pain control, monitor vs  Patient Goals: feel better  Family Goals: [place]    Progress made toward(s) clinical / shift goals:      Patient is not progressing towards the following goals:

## 2024-03-28 NOTE — CARE PLAN
The patient is Stable - Low risk of patient condition declining or worsening    Shift Goals  Clinical Goals: monitor BP, pain control, afety  Patient Goals: feel better  Family Goals: [place]    Progress made toward(s) clinical / shift goals:      Problem: Pain - Standard  Goal: Alleviation of pain or a reduction in pain to the patient’s comfort goal  Outcome: Progressing  Note: Patient using appropriate methods to help reduce pain in face and abdomen.     Problem: Hemodynamics  Goal: Patient's hemodynamics, fluid balance and neurologic status will be stable or improve  Outcome: Progressing  Note: Monitoring VS, I&O's, and mental status appropriately       Patient is not progressing towards the following goals:

## 2024-03-28 NOTE — PROGRESS NOTES
Patient enrolled in the 14 day o Holter monitoring program per Zulay Martinez, APRN.  >Hospital hook-up by Jeremiah, serial #   >Currently pending EOS.

## 2024-03-28 NOTE — PROGRESS NOTES
Bedside report received from off going RN/tech: Gita, assumed care of patient.     Fall Risk Score: MODERATE RISK  Fall risk interventions in place: Place yellow fall risk ID band on patient, Provide patient/family education based on risk assessment, Educate patient/family to call staff for assistance when getting out of bed, Place fall precaution signage outside patient door, Utilize bed/chair fall alarm, and Bed alarm connected correctly  Bed type: Regular (Kem Score less than 17 interventions in place)  Patient on cardiac monitor: Yes  IVF/IV medications: Not Applicable   Oxygen: Room Air  Bedside sitter: Not Applicable   Isolation: Not applicable

## 2024-03-28 NOTE — CARE PLAN
Problem: Pain - Standard  Goal: Alleviation of pain or a reduction in pain to the patient’s comfort goal  Outcome: Met     Problem: Hemodynamics  Goal: Patient's hemodynamics, fluid balance and neurologic status will be stable or improve  Outcome: Met   The patient is Stable - Low risk of patient condition declining or worsening    Shift Goals  Clinical Goals: pain control  Patient Goals: go home  Family Goals: [place]    Progress made toward(s) clinical / shift goals:      Patient is not progressing towards the following goals:

## 2024-04-18 ENCOUNTER — TELEPHONE (OUTPATIENT)
Dept: CARDIOLOGY | Facility: MEDICAL CENTER | Age: 74
End: 2024-04-18
Payer: COMMERCIAL

## 2024-04-18 DIAGNOSIS — T67.1XXA HEAT SYNCOPE, INITIAL ENCOUNTER: ICD-10-CM

## 2024-04-18 DIAGNOSIS — R55 SYNCOPE, UNSPECIFIED SYNCOPE TYPE: ICD-10-CM
